# Patient Record
Sex: FEMALE | Race: WHITE | Employment: OTHER | URBAN - METROPOLITAN AREA
[De-identification: names, ages, dates, MRNs, and addresses within clinical notes are randomized per-mention and may not be internally consistent; named-entity substitution may affect disease eponyms.]

---

## 2017-02-01 ENCOUNTER — OFFICE VISIT (OUTPATIENT)
Dept: CARDIOLOGY CLINIC | Age: 62
End: 2017-02-01

## 2017-02-01 VITALS
HEART RATE: 79 BPM | DIASTOLIC BLOOD PRESSURE: 75 MMHG | OXYGEN SATURATION: 98 % | BODY MASS INDEX: 27.32 KG/M2 | SYSTOLIC BLOOD PRESSURE: 176 MMHG | WEIGHT: 170 LBS | HEIGHT: 66 IN

## 2017-02-01 DIAGNOSIS — I10 ESSENTIAL HYPERTENSION: ICD-10-CM

## 2017-02-01 DIAGNOSIS — R00.2 PALPITATIONS: Primary | ICD-10-CM

## 2017-02-01 RX ORDER — METOPROLOL SUCCINATE 25 MG/1
25 TABLET, EXTENDED RELEASE ORAL DAILY
COMMUNITY
Start: 2017-01-22

## 2017-02-01 NOTE — MR AVS SNAPSHOT
Visit Information Date & Time Provider Department Dept. Phone Encounter #  
 2/1/2017 10:20 AM Jose Miguel Everett MD Cardio Specialist at [facility]/[address] [phone] [id number] Follow-up Instructions Return in about 4 weeks (around 3/1/2017). Upcoming Health Maintenance Date Due Hepatitis C Screening 1955 DTaP/Tdap/Td series (1 - Tdap) 5/9/1977 PAP AKA CERVICAL CYTOLOGY 5/9/1977 BREAST CANCER SCRN MAMMOGRAM 5/9/2006 FOBT Q 1 YEAR AGE 50-75 5/9/2006 ZOSTER VACCINE AGE 60> 5/9/2016 INFLUENZA AGE 9 TO ADULT 8/1/2016 Allergies as of 2/1/2017  Review Complete On: 2/1/2017 By: Jose Miguel Everett MD  
 No Known Allergies Current Immunizations  Reviewed on 3/13/2016 Name Date Influenza Vaccine 10/1/2015 Not reviewed this visit You Were Diagnosed With   
  
 Codes Comments Palpitations    -  Primary ICD-10-CM: R00.2 ICD-9-CM: 785.1 Essential hypertension     ICD-10-CM: I10 
ICD-9-CM: 401.9 Vitals BP Pulse Height(growth percentile) Weight(growth percentile) SpO2 BMI  
 176/75 79 5' 6\" (1.676 m) 170 lb (77.1 kg) 98% 27.44 kg/m2 OB Status Smoking Status Postmenopausal Never Smoker BMI and BSA Data Body Mass Index Body Surface Area  
 27.44 kg/m 2 1.89 m 2 Preferred Pharmacy Pharmacy Name Phone [address] [phone] Your Updated Medication List  
  
   
This list is accurate as of: 2/1/17 11:16 AM.  Always use your most recent med list.  
  
  
  
  
 ALPRAZolam 0.5 mg tablet Commonly known as:  Kaleta Filomena Take 0.5 mg by mouth daily as needed. aspirin 325 mg tablet Commonly known as:  ASPIRIN Take 1 Tab by mouth daily. atorvastatin 20 mg tablet Commonly known as:  LIPITOR Take 20 mg by mouth daily. calcium 500 mg Tab Take 2 Tabs by mouth daily. cyclobenzaprine 10 mg tablet Commonly known as:  FLEXERIL Take 10 mg by mouth three (3) times daily as needed. gabapentin 100 mg capsule Commonly known as:  NEURONTIN One cap every morning, and 2 caps every night  
  
 glucosamine-chondroitin 500-400 mg Cap Commonly known as:  [address] Take 1 Cap by mouth daily. ibuprofen 400 mg tablet Commonly known as:  MOTRIN Take 1 Tab by mouth every six (6) hours as needed for Pain.  
  
 levothyroxine 25 mcg tablet Commonly known as:  SYNTHROID Take 25 mcg by mouth Daily (before breakfast). metoprolol succinate 25 mg XL tablet Commonly known as:  TOPROL-XL Take 25 mg by mouth daily. omega-3 fatty acids-vitamin e 1,000 mg Cap Take 3 Caps by mouth daily. We Performed the Following AMB POC EKG ROUTINE W/ 12 LEADS, INTER & REP [31145 CPT(R)] Follow-up Instructions Return in about 4 weeks (around 3/1/2017). Introducing [facility] SERVICES! Dear Benjamin Hodges: Thank you for requesting a [email or web address] account. Our records indicate that you already have an active [email or web address] account. You can access your account anytime at [email or web address] Did you know that you can access your hospital and ER discharge instructions at any time in [email or web address]? You can also review all of your test results from your hospital stay or ER visit. Additional Information If you have questions, please visit the Frequently Asked Questions section of the [email or web address] website at [email or web address] Remember, [email or web address] is NOT to be used for urgent needs. For medical emergencies, dial 911. Now available from your iPhone and Android! Please provide this summary of care documentation to your next provider. Your primary care clinician is listed as Belen Chau. If you have any questions after today's visit, please call [phone].

## 2017-02-01 NOTE — PROGRESS NOTES
1. Have you been to the ER, urgent care clinic since your last visit? Hospitalized since your last visit? No    2. Have you seen or consulted any other health care providers outside of the [address] [place] [address] since your last visit? Include any pap smears or colon screening.  No

## 2017-02-01 NOTE — PROGRESS NOTES
History of Present Illness:  A 62 y.o. female referred by Dr. Cecy Santiago for evaluation for palpitations. She has had these palpitations for about a year and tried on multiple medications including Metoprolol, switched to Propranolol. She had some issues with bradycardia and was tried on Cardizem and subsequently Toprol 25 mg a day. She has had some atypical chest pain as well as nerve pain. She relates the initiation of the palpitations to when she started having problems with her cervical spine. She was treated with Prednisone for a while and taking Neurontin. No syncope, PND, orthopnea, edema. Impression:   1. History of recurrent palpitations which appear to be hypersensitivity to sinus arrhythmia. 2. Possible white coat syndrome, however, blood pressure is consistently elevated whenever she is in the office. She has had her blood pressure checked with home device and is always normal.  3. History of stress test in 2016, unremarkable. 4. History of cervical disc disease with left arm paresthesias with history of treatment with Prednisone. 5. History of questionable TIA. At this point, I do not have a good explanation of her palpitations. I am feeling her pulse and she tells me she is having the palpitations but it is completely regular and was confirmed by the EKG. She does, however, have a history of bradycardia. I am going to check another 48-hour Holter monitor as well as an ambulatory blood pressure monitor to make sure that she is not hypertensive and potentially more sensitive to her sinus rhythm during those episodes. I will tentatively have her see Dr. Delisa Riley in follow up. I see no indication for an EP study or pacemaker at this time. A pacemaker would simply change the origin of her beats, but not change overall symptoms. She has had her thyroid checked which has been normal.  All questions answered.     Past Medical History   Diagnosis Date    Hypercholesterolemia     Hypertension     Hypothyroid        Current Outpatient Prescriptions   Medication Sig Dispense Refill    metoprolol succinate (TOPROL-XL) 25 mg XL tablet Take 25 mg by mouth daily.  ALPRAZolam (XANAX) 0.5 mg tablet Take 0.5 mg by mouth daily as needed.  cyclobenzaprine (FLEXERIL) 10 mg tablet Take 10 mg by mouth three (3) times daily as needed.  gabapentin (NEURONTIN) 100 mg capsule One cap every morning, and 2 caps every night      ibuprofen (MOTRIN) 400 mg tablet Take 1 Tab by mouth every six (6) hours as needed for Pain. 20 Tab 0    levothyroxine (SYNTHROID) 25 mcg tablet Take 25 mcg by mouth Daily (before breakfast).  atorvastatin (LIPITOR) 20 mg tablet Take 20 mg by mouth daily.  aspirin (ASPIRIN) 325 mg tablet Take 1 Tab by mouth daily. 30 Tab 0    omega-3 fatty acids-vitamin e 1,000 mg cap Take 3 Caps by mouth daily.  glucosamine-chondroitin (ARTHX) 500-400 mg cap Take 1 Cap by mouth daily.  calcium 500 mg tab Take 2 Tabs by mouth daily. Social History   reports that she has never smoked. She does not have any smokeless tobacco history on file. reports that she drinks alcohol. Family History  family history is not on file. Review of Systems  Except as stated above include:  Constitutional: Negative for fever, chills and malaise/fatigue. HEENT: No congestion or recent URI. Gastrointestinal: No nausea, vomiting, abdominal pain, bloody stools. Pulmonary:  Negative except as stated above. Cardiac:  Negative except as stated above. Musculoskeletal: Negative except as stated above. Neurological:  No localized symptoms. Skin:  Negative except as stated above. Psych:  No mood swings. Endocrine:  No heat/cold intolerance.     PHYSICAL EXAM  BP Readings from Last 3 Encounters:   02/01/17 176/75   12/14/16 180/75   10/28/16 135/56     Pulse Readings from Last 3 Encounters:   02/01/17 79   12/14/16 87   10/28/16 (!) 56     Wt Readings from Last 3 Encounters:   02/01/17 77.1 kg (170 lb)   12/14/16 76.7 kg (169 lb)   10/27/16 76.2 kg (168 lb)     General:   Well developed, well groomed. Head/Neck:   No jugular venous distention     No carotid bruits. No evidence of xanthelasma. Lungs:   No respiratory distress. Clear bilaterally. Heart:    Regular rate and rhythm. Normal S1/S2. Palpation of heart with normal point of maximum impulse. RUSB systolic murmur  Abdomen:   Soft and nontender. No palpable abdominal mass or bruits. Extremities:   Intact peripheral pulses. No significant edema. Neurological:   Alert and oriented to person, place, time. No focal neurological deficit visually.

## 2017-02-28 ENCOUNTER — HOSPITAL ENCOUNTER (OUTPATIENT)
Dept: NON INVASIVE DIAGNOSTICS | Age: 62
Discharge: HOME | End: 2017-02-28
Attending: INTERNAL MEDICINE
Payer: COMMERCIAL

## 2017-02-28 DIAGNOSIS — R00.2 PALPITATIONS: ICD-10-CM

## 2017-02-28 PROCEDURE — 93225 XTRNL ECG REC<48 HRS REC: CPT

## 2017-04-03 ENCOUNTER — OFFICE VISIT (OUTPATIENT)
Dept: CARDIOLOGY CLINIC | Age: 62
End: 2017-04-03

## 2017-04-03 VITALS
DIASTOLIC BLOOD PRESSURE: 76 MMHG | HEART RATE: 80 BPM | WEIGHT: 174 LBS | OXYGEN SATURATION: 98 % | HEIGHT: 66 IN | SYSTOLIC BLOOD PRESSURE: 177 MMHG | BODY MASS INDEX: 27.97 KG/M2

## 2017-04-03 DIAGNOSIS — R42 DIZZINESS: ICD-10-CM

## 2017-04-03 DIAGNOSIS — R20.0 NUMBNESS: ICD-10-CM

## 2017-04-03 DIAGNOSIS — E78.5 DYSLIPIDEMIA: ICD-10-CM

## 2017-04-03 DIAGNOSIS — R00.2 PALPITATIONS: ICD-10-CM

## 2017-04-03 DIAGNOSIS — G45.9 TRANSIENT CEREBRAL ISCHEMIA, UNSPECIFIED TYPE: ICD-10-CM

## 2017-04-03 DIAGNOSIS — R07.2 PRECORDIAL PAIN: ICD-10-CM

## 2017-04-03 DIAGNOSIS — I10 ESSENTIAL HYPERTENSION: Primary | Chronic | ICD-10-CM

## 2017-04-03 NOTE — LETTER
Patient:  Margarita Maldonado YOB: 1955 Date of Visit: 4/3/2017 Dear LUCIA Bal 
[address] [facility],[address] 
[address] [place] [id number] VIA Facsimile: [phone] 
 : Thank you for referring Ms. Margarita Maldonado to me for evaluation/treatment. Below are the relevant portions of my assessment and plan of care. Subjective:  
   Margarita Maldonado is in the office today for cardiac reevaluation. She is a 45-year-old woman that has been troubled by palpitations for years. She has been on various betablockers and at the present time, is taking metoprolol succinate at 25 mg per day. She had some excessive slowing of heart rate on betablockers in the past.  She was also on Cardizem at one point, which she did not tolerate. She also complains of chest pain and had a CT angiogram of the heart done on 11/07/2016. Her coronaries were normal at that time. Because of the persistence of her symptoms, she had an electrophysiological office evaluation done by Dr. Tennille Valderrama on 02/01/2017. Dr. Tennille Valderrama mentioned that when the patient was complaining of palpitations, he was taking her pulse and there was no irregularity, which was confirmed by electrocardiogram.  No further suggestions in her care were made at that time. In the office today, she says she is doing El Kobi.   She is still having nerve pain and this is being evaluated by Neurology. She does have some problems with reflux and has been taking an acid reliever.   The patient relates no new symptoms since her last visit. Patient Active Problem List  
 Diagnosis Date Noted  Numbness 04/25/2016 Priority: 1 - One  
 Normal coronary arteries 12/21/2016  Palpitations 05/17/2016  Dyslipidemia 04/13/2016  Dizziness 04/13/2016  Precordial pain 04/13/2016  TIA (transient ischemic attack) 03/13/2016  Hypertension 03/13/2016  Hypothyroidism 03/13/2016  Hyperglycemia 03/13/2016  UTI (urinary tract infection) 03/13/2016 Current Outpatient Prescriptions Medication Sig Dispense Refill  metoprolol succinate (TOPROL-XL) 25 mg XL tablet Take 25 mg by mouth daily.  ALPRAZolam (XANAX) 0.5 mg tablet Take 0.5 mg by mouth daily as needed.  cyclobenzaprine (FLEXERIL) 10 mg tablet Take 10 mg by mouth three (3) times daily as needed.  gabapentin (NEURONTIN) 100 mg capsule One cap every morning, and 2 caps every night  ibuprofen (MOTRIN) 400 mg tablet Take 1 Tab by mouth every six (6) hours as needed for Pain. 20 Tab 0  
 levothyroxine (SYNTHROID) 25 mcg tablet Take 25 mcg by mouth Daily (before breakfast).  atorvastatin (LIPITOR) 20 mg tablet Take 20 mg by mouth daily.  aspirin (ASPIRIN) 325 mg tablet Take 1 Tab by mouth daily. 30 Tab 0  
 omega-3 fatty acids-vitamin e 1,000 mg cap Take 3 Caps by mouth daily.  glucosamine-chondroitin (ARTHX) 500-400 mg cap Take 1 Cap by mouth daily.  calcium 500 mg tab Take 2 Tabs by mouth daily. No Known Allergies Past Medical History:  
Diagnosis Date  Hypercholesterolemia  Hypertension  Hypothyroid No past surgical history on file. No family history on file. History Smoking Status  Never Smoker Smokeless Tobacco  
 Not on file Review of Systems, additional: 
Constitutional: negative Eyes: negative Respiratory: negative Cardiovascular: positive for palpitations Gastrointestinal: negative Musculoskeletal:negative Neurological: negative Behvioral/Psych: negative Endocrine: negative ENT: negative Objective:  
 
Visit Vitals  /76  Pulse 80  
 Ht 5' 6\" (1.676 m)  Wt 174 lb (78.9 kg)  SpO2 98%  BMI 28.08 kg/m2 General:  alert, cooperative, no distress Chest Wall: inspection normal - no chest wall deformities or tenderness, respiratory effort normal  
Lung: clear to auscultation bilaterally Heart:  normal rate, regular rhythm, normal S1, S2, no murmurs, rubs, clicks or gallops Abdomen: soft, non-tender. Bowel sounds normal. No masses,  no organomegaly Extremities: extremities normal, atraumatic, no cyanosis or edema Skin: no rashes Neuro: alert, oriented, normal speech, no focal findings or movement disorder noted Assessment/Plan: ICD-10-CM ICD-9-CM 1. Essential hypertension I10 401.9 2. Transient cerebral ischemia, unspecified type G45.9 435.9 3. Dyslipidemia E78.5 272.4 4. Dizziness R42 780.4 5. Precordial pain, atypical for cardiac origin R07.2 786.51   
6. Palpitations, recently evaluated by EP , no new suggestions. Will continue metoprolol. Recent 48 hour holter on 3/3/2017; Minimal ectopy with no repetitive ventricular ectopics. RT 4 mos R00.2 785.1 7. Normal coronary arteries, Cardiac CTA done on 11/7/2016 Z03.89 V71.7 8. Numbness R20.0 782.0 If you have questions, please do not hesitate to call me. I look forward to following Ms. Julieta Mullendebbi along with you. Sincerely, Marvin Maldonado MD

## 2017-04-03 NOTE — PROGRESS NOTES
1. Have you been to the ER, urgent care clinic since your last visit? Hospitalized since your last visit? No    2. Have you seen or consulted any other health care providers outside of the [address] [place] [address] since your last visit? Include any pap smears or colon screening.  No

## 2017-04-03 NOTE — MR AVS SNAPSHOT
Visit Information Date & Time Provider Department Dept. Phone Encounter #  
 4/3/2017 11:00 AM [facility] Rajput  [facility] [place] [phone] Follow-up Instructions Return in about 4 months (around 8/3/2017). Upcoming Health Maintenance Date Due Hepatitis C Screening 1955 DTaP/Tdap/Td series (1 - Tdap) 5/9/1977 PAP AKA CERVICAL CYTOLOGY 5/9/1977 BREAST CANCER SCRN MAMMOGRAM 5/9/2006 FOBT Q 1 YEAR AGE 50-75 5/9/2006 ZOSTER VACCINE AGE 60> 5/9/2016 INFLUENZA AGE 9 TO ADULT 8/1/2016 Allergies as of 4/3/2017  Review Complete On: 4/3/2017 By: Anastasia Corrigan LPN No Known Allergies Current Immunizations  Reviewed on 3/13/2016 Name Date Influenza Vaccine 10/1/2015 Not reviewed this visit Vitals BP Pulse Height(growth percentile) Weight(growth percentile) SpO2 BMI  
 177/76 80 5' 6\" (1.676 m) 174 lb (78.9 kg) 98% 28.08 kg/m2 OB Status Smoking Status Postmenopausal Never Smoker Vitals History BMI and BSA Data Body Mass Index Body Surface Area 28.08 kg/m 2 1.92 m 2 Preferred Pharmacy Pharmacy Name Phone [facility] [address] [place] [address]. [address] [phone] Your Updated Medication List  
  
   
This list is accurate as of: 4/3/17 11:29 AM.  Always use your most recent med list.  
  
  
  
  
 ALPRAZolam 0.5 mg tablet Commonly known as:  Lesa [place] Take 0.5 mg by mouth daily as needed. aspirin 325 mg tablet Commonly known as:  ASPIRIN Take 1 Tab by mouth daily. atorvastatin 20 mg tablet Commonly known as:  LIPITOR Take 20 mg by mouth daily. calcium 500 mg Tab Take 2 Tabs by mouth daily. cyclobenzaprine 10 mg tablet Commonly known as:  FLEXERIL Take 10 mg by mouth three (3) times daily as needed. gabapentin 100 mg capsule Commonly known as:  NEURONTIN One cap every morning, and 2 caps every night  
  
 glucosamine-chondroitin 500-400 mg Cap Commonly known as:  [address] [facility] Take 1 Cap by mouth daily. ibuprofen 400 mg tablet Commonly known as:  MOTRIN Take 1 Tab by mouth every six (6) hours as needed for Pain.  
  
 levothyroxine 25 mcg tablet Commonly known as:  SYNTHROID Take 25 mcg by mouth Daily (before breakfast). metoprolol succinate 25 mg XL tablet Commonly known as:  TOPROL-XL Take 25 mg by mouth daily. omega-3 fatty acids-vitamin e 1,000 mg Cap Take 3 Caps by mouth daily. Follow-up Instructions Return in about 4 months (around 8/3/2017). Introducing [facility] SERVICES! Dear Lady Arias: Thank you for requesting a [employer] account. Our records indicate that you already have an active [employer] account. You can access your account anytime at [email or web address] Did you know that you can access your hospital and ER discharge instructions at any time in [employer]? You can also review all of your test results from your hospital stay or ER visit. Additional Information If you have questions, please visit the Frequently Asked Questions section of the [employer] website at [email or web address] Remember, [employer] is NOT to be used for urgent needs. For medical emergencies, dial 911. Now available from your iPhone and Android! Please provide this summary of care documentation to your next provider. Your primary care clinician is listed as Belen Chau. If you have any questions after today's visit, please call [phone].

## 2017-04-17 NOTE — PROGRESS NOTES
Subjective:      Kartik Goetz is in the office today for cardiac reevaluation. She is a 60-year-old woman that has been troubled by palpitations for years. She has been on various betablockers and at the present time, is taking metoprolol succinate at 25 mg per day. She had some excessive slowing of heart rate on betablockers in the past.  She was also on Cardizem at one point, which she did not tolerate. She also complains of chest pain and had a CT angiogram of the heart done on 11/07/2016. Her coronaries were normal at that time. Because of the persistence of her symptoms, she had an electrophysiological office evaluation done by Dr. Carmine Plaza on 02/01/2017. Dr. Carmine Plaza mentioned that when the patient was complaining of palpitations, he was taking her pulse and there was no irregularity, which was confirmed by electrocardiogram.  No further suggestions in her care were made at that time. In the office today, she says she is doing [place].   She is still having nerve pain and this is being evaluated by Neurology. She does have some problems with reflux and has been taking an acid reliever.   The patient relates no new symptoms since her last visit. Patient Active Problem List    Diagnosis Date Noted    Numbness 04/25/2016     Priority: 1 - One    Normal coronary arteries 12/21/2016    Palpitations 05/17/2016    Dyslipidemia 04/13/2016    Dizziness 04/13/2016    Precordial pain 04/13/2016    TIA (transient ischemic attack) 03/13/2016    Hypertension 03/13/2016    Hypothyroidism 03/13/2016    Hyperglycemia 03/13/2016    UTI (urinary tract infection) 03/13/2016     Current Outpatient Prescriptions   Medication Sig Dispense Refill    metoprolol succinate (TOPROL-XL) 25 mg XL tablet Take 25 mg by mouth daily.  ALPRAZolam (XANAX) 0.5 mg tablet Take 0.5 mg by mouth daily as needed.       cyclobenzaprine (FLEXERIL) 10 mg tablet Take 10 mg by mouth three (3) times daily as needed.  gabapentin (NEURONTIN) 100 mg capsule One cap every morning, and 2 caps every night      ibuprofen (MOTRIN) 400 mg tablet Take 1 Tab by mouth every six (6) hours as needed for Pain. 20 Tab 0    levothyroxine (SYNTHROID) 25 mcg tablet Take 25 mcg by mouth Daily (before breakfast).  atorvastatin (LIPITOR) 20 mg tablet Take 20 mg by mouth daily.  aspirin (ASPIRIN) 325 mg tablet Take 1 Tab by mouth daily. 30 Tab 0    omega-3 fatty acids-vitamin e 1,000 mg cap Take 3 Caps by mouth daily.  glucosamine-chondroitin (ARTHX) 500-400 mg cap Take 1 Cap by mouth daily.  calcium 500 mg tab Take 2 Tabs by mouth daily. No Known Allergies  Past Medical History:   Diagnosis Date    Hypercholesterolemia     Hypertension     Hypothyroid      No past surgical history on file. No family history on file. History   Smoking Status    Never Smoker   Smokeless Tobacco    Not on file          Review of Systems, additional:  Constitutional: negative  Eyes: negative  Respiratory: negative  Cardiovascular: positive for palpitations  Gastrointestinal: negative  Musculoskeletal:negative  Neurological: negative  Behvioral/Psych: negative  Endocrine: negative  ENT: negative    Objective:     Visit Vitals    /76    Pulse 80    Ht 5' 6\" (1.676 m)    Wt 174 lb (78.9 kg)    SpO2 98%    BMI 28.08 kg/m2     General:  alert, cooperative, no distress   Chest Wall: inspection normal - no chest wall deformities or tenderness, respiratory effort normal   Lung: clear to auscultation bilaterally   Heart:  normal rate, regular rhythm, normal S1, S2, no murmurs, rubs, clicks or gallops   Abdomen: soft, non-tender. Bowel sounds normal. No masses,  no organomegaly   Extremities: extremities normal, atraumatic, no cyanosis or edema Skin: no rashes   Neuro: alert, oriented, normal speech, no focal findings or movement disorder noted         Assessment/Plan:       ICD-10-CM ICD-9-CM    1. Essential hypertension I10 401.9    2. Transient cerebral ischemia, unspecified type G45.9 435.9    3. Dyslipidemia E78.5 272.4    4. Dizziness R42 780.4    5. Precordial pain, atypical for cardiac origin R07.2 786.51    6. Palpitations, recently evaluated by EP , no new suggestions. Will continue metoprolol. Recent 48 hour holter on 3/3/2017; Minimal ectopy with no repetitive ventricular ectopics. RT 4 mos R00.2 785.1    7. Normal coronary arteries, Cardiac CTA done on 11/7/2016 Z03.89 V71.7    8.  Numbness R20.0 782.0

## 2017-04-21 NOTE — COMMUNICATION BODY
Subjective:      Ny Mast is in the office today for cardiac reevaluation. She is a 45-year-old woman that has been troubled by palpitations for years. She has been on various betablockers and at the present time, is taking metoprolol succinate at 25 mg per day. She had some excessive slowing of heart rate on betablockers in the past.  She was also on Cardizem at one point, which she did not tolerate. She also complains of chest pain and had a CT angiogram of the heart done on 11/07/2016. Her coronaries were normal at that time. Because of the persistence of her symptoms, she had an electrophysiological office evaluation done by Dr. Tamar Swift on 02/01/2017. Dr. Tamar Swift mentioned that when the patient was complaining of palpitations, he was taking her pulse and there was no irregularity, which was confirmed by electrocardiogram.  No further suggestions in her care were made at that time. In the office today, she says she is doing El Kobi.   She is still having nerve pain and this is being evaluated by Neurology. She does have some problems with reflux and has been taking an acid reliever.   The patient relates no new symptoms since her last visit. Patient Active Problem List    Diagnosis Date Noted    Numbness 04/25/2016     Priority: 1 - One    Normal coronary arteries 12/21/2016    Palpitations 05/17/2016    Dyslipidemia 04/13/2016    Dizziness 04/13/2016    Precordial pain 04/13/2016    TIA (transient ischemic attack) 03/13/2016    Hypertension 03/13/2016    Hypothyroidism 03/13/2016    Hyperglycemia 03/13/2016    UTI (urinary tract infection) 03/13/2016     Current Outpatient Prescriptions   Medication Sig Dispense Refill    metoprolol succinate (TOPROL-XL) 25 mg XL tablet Take 25 mg by mouth daily.  ALPRAZolam (XANAX) 0.5 mg tablet Take 0.5 mg by mouth daily as needed.       cyclobenzaprine (FLEXERIL) 10 mg tablet Take 10 mg by mouth three (3) times daily as needed.  gabapentin (NEURONTIN) 100 mg capsule One cap every morning, and 2 caps every night      ibuprofen (MOTRIN) 400 mg tablet Take 1 Tab by mouth every six (6) hours as needed for Pain. 20 Tab 0    levothyroxine (SYNTHROID) 25 mcg tablet Take 25 mcg by mouth Daily (before breakfast).  atorvastatin (LIPITOR) 20 mg tablet Take 20 mg by mouth daily.  aspirin (ASPIRIN) 325 mg tablet Take 1 Tab by mouth daily. 30 Tab 0    omega-3 fatty acids-vitamin e 1,000 mg cap Take 3 Caps by mouth daily.  glucosamine-chondroitin (ARTHX) 500-400 mg cap Take 1 Cap by mouth daily.  calcium 500 mg tab Take 2 Tabs by mouth daily. No Known Allergies  Past Medical History:   Diagnosis Date    Hypercholesterolemia     Hypertension     Hypothyroid      No past surgical history on file. No family history on file. History   Smoking Status    Never Smoker   Smokeless Tobacco    Not on file          Review of Systems, additional:  Constitutional: negative  Eyes: negative  Respiratory: negative  Cardiovascular: positive for palpitations  Gastrointestinal: negative  Musculoskeletal:negative  Neurological: negative  Behvioral/Psych: negative  Endocrine: negative  ENT: negative    Objective:     Visit Vitals    /76    Pulse 80    Ht 5' 6\" (1.676 m)    Wt 174 lb (78.9 kg)    SpO2 98%    BMI 28.08 kg/m2     General:  alert, cooperative, no distress   Chest Wall: inspection normal - no chest wall deformities or tenderness, respiratory effort normal   Lung: clear to auscultation bilaterally   Heart:  normal rate, regular rhythm, normal S1, S2, no murmurs, rubs, clicks or gallops   Abdomen: soft, non-tender. Bowel sounds normal. No masses,  no organomegaly   Extremities: extremities normal, atraumatic, no cyanosis or edema Skin: no rashes   Neuro: alert, oriented, normal speech, no focal findings or movement disorder noted         Assessment/Plan:       ICD-10-CM ICD-9-CM    1. Essential hypertension I10 401.9    2. Transient cerebral ischemia, unspecified type G45.9 435.9    3. Dyslipidemia E78.5 272.4    4. Dizziness R42 780.4    5. Precordial pain, atypical for cardiac origin R07.2 786.51    6. Palpitations, recently evaluated by EP , no new suggestions. Will continue metoprolol. Recent 48 hour holter on 3/3/2017; Minimal ectopy with no repetitive ventricular ectopics. RT 4 mos R00.2 785.1    7. Normal coronary arteries, Cardiac CTA done on 11/7/2016 Z03.89 V71.7    8.  Numbness R20.0 782.0

## 2017-09-11 ENCOUNTER — OFFICE VISIT (OUTPATIENT)
Dept: CARDIOLOGY CLINIC | Age: 62
End: 2017-09-11

## 2017-09-11 VITALS
WEIGHT: 180 LBS | DIASTOLIC BLOOD PRESSURE: 80 MMHG | SYSTOLIC BLOOD PRESSURE: 174 MMHG | OXYGEN SATURATION: 96 % | BODY MASS INDEX: 28.93 KG/M2 | HEIGHT: 66 IN

## 2017-09-11 DIAGNOSIS — G45.9 TRANSIENT CEREBRAL ISCHEMIA, UNSPECIFIED TYPE: ICD-10-CM

## 2017-09-11 DIAGNOSIS — E78.5 DYSLIPIDEMIA: ICD-10-CM

## 2017-09-11 DIAGNOSIS — R00.2 PALPITATIONS: ICD-10-CM

## 2017-09-11 DIAGNOSIS — R07.2 PRECORDIAL PAIN: ICD-10-CM

## 2017-09-11 DIAGNOSIS — R42 DIZZINESS: ICD-10-CM

## 2017-09-11 DIAGNOSIS — R20.0 NUMBNESS: ICD-10-CM

## 2017-09-11 DIAGNOSIS — I10 ESSENTIAL HYPERTENSION: Primary | Chronic | ICD-10-CM

## 2017-09-11 NOTE — MR AVS SNAPSHOT
Visit Information Date & Time Provider Department Dept. Phone Encounter #  
 9/11/2017 10:45 AM Mckenzie Sandoval  [facility] [place] [address] [id number] Follow-up Instructions Return in about 6 months (around 3/11/2018). Upcoming Health Maintenance Date Due Hepatitis C Screening 1955 DTaP/Tdap/Td series (1 - Tdap) 5/9/1977 PAP AKA CERVICAL CYTOLOGY 5/9/1977 BREAST CANCER SCRN MAMMOGRAM 5/9/2006 FOBT Q 1 YEAR AGE 50-75 5/9/2006 ZOSTER VACCINE AGE 60> 3/9/2016 INFLUENZA AGE 9 TO ADULT 8/1/2017 Allergies as of 9/11/2017  Review Complete On: 4/17/2017 By: Mckenzie Sandoval MD  
 No Known Allergies Current Immunizations  Reviewed on 3/13/2016 Name Date Influenza Vaccine 10/1/2015 Not reviewed this visit Vitals BP Height(growth percentile) Weight(growth percentile) SpO2 BMI OB Status 174/80 5' 6\" (1.676 m) 180 lb (81.6 kg) 96% 29.05 kg/m2 Postmenopausal  
 Smoking Status Never Smoker BMI and BSA Data Body Mass Index Body Surface Area 29.05 kg/m 2 1.95 m 2 Preferred Pharmacy Pharmacy Name Phone [facility] [address] [place] [address]. [address] [phone] Your Updated Medication List  
  
   
This list is accurate as of: 9/11/17 11:09 AM.  Always use your most recent med list.  
  
  
  
  
 ALPRAZolam 0.5 mg tablet Commonly known as:  Darus Breeding Take 0.5 mg by mouth daily as needed. aspirin 325 mg tablet Commonly known as:  ASPIRIN Take 1 Tab by mouth daily. atorvastatin 20 mg tablet Commonly known as:  LIPITOR Take 20 mg by mouth daily. calcium 500 mg Tab Take 2 Tabs by mouth daily. cyclobenzaprine 10 mg tablet Commonly known as:  FLEXERIL Take 10 mg by mouth three (3) times daily as needed. gabapentin 100 mg capsule Commonly known as:  NEURONTIN  
 200 mg three (3) times daily. One cap every morning, and 2 caps every night  
  
 glucosamine-chondroitin 500-400 mg Cap Commonly known as:  [address] Take 1 Cap by mouth daily. ibuprofen 400 mg tablet Commonly known as:  MOTRIN Take 1 Tab by mouth every six (6) hours as needed for Pain.  
  
 levothyroxine 25 mcg tablet Commonly known as:  SYNTHROID Take 25 mcg by mouth Daily (before breakfast). metoprolol succinate 25 mg XL tablet Commonly known as:  TOPROL-XL Take 25 mg by mouth daily. omega-3 fatty acids-vitamin e 1,000 mg Cap Take 3 Caps by mouth daily. Follow-up Instructions Return in about 6 months (around 3/11/2018). Introducing [facility] SERVICES! Dear Markie Astorga: Thank you for requesting a [employer] account. Our records indicate that you already have an active [employer] account. You can access your account anytime at [email or web address] Did you know that you can access your hospital and ER discharge instructions at any time in [employer]? You can also review all of your test results from your hospital stay or ER visit. Additional Information If you have questions, please visit the Frequently Asked Questions section of the [employer] website at [email or web address] Remember, [employer] is NOT to be used for urgent needs. For medical emergencies, dial 911. Now available from your iPhone and Android! Please provide this summary of care documentation to your next provider. Your primary care clinician is listed as Catarina Garcia. If you have any questions after today's visit, please call [phone].

## 2017-09-15 NOTE — PROGRESS NOTES
Subjective:      Sherryle Pence is in the office today for cardiac reevaluation. She is a 20-year-old woman that has been troubled by palpitations for years. She has been on various betablockers and at the present time, is taking metoprolol succinate at 25 mg per day. She had some excessive slowing of heart rate on betablockers in the past.  She was also on Cardizem at one point, which she did not tolerate. In the office today, she says there has been little change. She also complains of chest pain and had a CT angiogram of the heart was done on 11/07/2016. Her coronaries were normal at that time. Because of the persistence of her symptoms, she had an electrophysiological office evaluation done by Dr. Kosta Barr on 02/01/2017. Dr. Kosta Barr mentioned that when the patient was complaining of palpitations, he was taking her pulse and there was no irregularity, which was confirmed by electrocardiography. No further suggestions in her care were made at that time. In the office today, she says she is doing El Kobi.   She is still having nerve pain and this is being evaluated by Neurology. She has an upcoming appointment with Dr Evelyn Freed. Patient Active Problem List    Diagnosis Date Noted    Numbness 04/25/2016     Priority: 1 - One    Normal coronary arteries 12/21/2016    Palpitations 05/17/2016    Dyslipidemia 04/13/2016    Dizziness 04/13/2016    Precordial pain 04/13/2016    TIA (transient ischemic attack) 03/13/2016    Hypertension 03/13/2016    Hypothyroidism 03/13/2016    Hyperglycemia 03/13/2016    UTI (urinary tract infection) 03/13/2016     Current Outpatient Prescriptions   Medication Sig Dispense Refill    metoprolol succinate (TOPROL-XL) 25 mg XL tablet Take 25 mg by mouth daily.  ALPRAZolam (XANAX) 0.5 mg tablet Take 0.5 mg by mouth daily as needed.  cyclobenzaprine (FLEXERIL) 10 mg tablet Take 10 mg by mouth three (3) times daily as needed.  gabapentin (NEURONTIN) 100 mg capsule 200 mg three (3) times daily. One cap every morning, and 2 caps every night      ibuprofen (MOTRIN) 400 mg tablet Take 1 Tab by mouth every six (6) hours as needed for Pain. 20 Tab 0    levothyroxine (SYNTHROID) 25 mcg tablet Take 25 mcg by mouth Daily (before breakfast).  atorvastatin (LIPITOR) 20 mg tablet Take 20 mg by mouth daily.  aspirin (ASPIRIN) 325 mg tablet Take 1 Tab by mouth daily. 30 Tab 0    omega-3 fatty acids-vitamin e 1,000 mg cap Take 3 Caps by mouth daily.  glucosamine-chondroitin (ARTHX) 500-400 mg cap Take 1 Cap by mouth daily.  calcium 500 mg tab Take 2 Tabs by mouth daily. No Known Allergies  Past Medical History:   Diagnosis Date    Hypercholesterolemia     Hypertension     Hypothyroid      No past surgical history on file. No family history on file. History   Smoking Status    Never Smoker   Smokeless Tobacco    Not on file          Review of Systems, additional:  Constitutional: negative  Eyes: negative  Respiratory: negative  Cardiovascular: positive for palpitations  Gastrointestinal: negative  Musculoskeletal:negative  Neurological: negative  Behvioral/Psych: negative  Endocrine: negative  ENT: negative    Objective:     Visit Vitals    /80    Ht 5' 6\" (1.676 m)    Wt 180 lb (81.6 kg)    SpO2 96%    BMI 29.05 kg/m2     General:  alert, cooperative, no distress   Chest Wall: inspection normal - no chest wall deformities or tenderness, respiratory effort normal   Lung: clear to auscultation bilaterally   Heart:  normal rate, regular rhythm, normal S1, S2, no murmurs, rubs, clicks or gallops   Abdomen: soft, non-tender. Bowel sounds normal. No masses,  no organomegaly   Extremities: extremities normal, atraumatic, no cyanosis or edema Skin: no rashes   Neuro: alert, oriented, normal speech, no focal findings or movement disorder noted         Assessment/Plan:       ICD-10-CM ICD-9-CM    1.  Essential hypertension, BP well-controlled at home. I10 401.9    2. Transient cerebral ischemia, unspecified type G45.9 435.9    3. Dyslipidemia E78.5 272.4    4. Dizziness R42 780.4    5. Precordial pain, atypical for cardiac origin with normal coronaries by Cardiac CTA in 11/2016 R07.2 786.51    6. Palpitations,  evaluated by EP. Will continue metoprolol. Last 48 hour holter on 3/3/2017; Minimal ectopy with no repetitive ventricular ectopics. RT 6 mos R00.2 785.1    7. Normal coronary arteries, by cardiac CTA done on 11/7/2016 Z03.89 V71.7    8.  Numbness, has appointment to see Neurology R20.0 782.0

## 2018-02-08 ENCOUNTER — HOSPITAL ENCOUNTER (OUTPATIENT)
Dept: ULTRASOUND IMAGING | Age: 63
Discharge: HOME | End: 2018-02-08
Attending: FAMILY MEDICINE
Payer: COMMERCIAL

## 2018-02-08 DIAGNOSIS — R10.11 ABDOMINAL PAIN, RIGHT UPPER QUADRANT: ICD-10-CM

## 2018-02-08 PROCEDURE — 76705 ECHO EXAM OF ABDOMEN: CPT

## 2018-02-13 ENCOUNTER — OFFICE VISIT (OUTPATIENT)
Dept: SURGERY | Age: 63
End: 2018-02-13

## 2018-02-13 VITALS
RESPIRATION RATE: 16 BRPM | HEIGHT: 66 IN | OXYGEN SATURATION: 97 % | TEMPERATURE: 97.2 F | WEIGHT: 176 LBS | HEART RATE: 85 BPM | SYSTOLIC BLOOD PRESSURE: 169 MMHG | BODY MASS INDEX: 28.28 KG/M2 | DIASTOLIC BLOOD PRESSURE: 67 MMHG

## 2018-02-13 DIAGNOSIS — R10.11 ABDOMINAL PAIN, RIGHT UPPER QUADRANT: ICD-10-CM

## 2018-02-13 DIAGNOSIS — K80.20 GALLSTONES: Primary | ICD-10-CM

## 2018-02-13 RX ORDER — AMITRIPTYLINE HYDROCHLORIDE 10 MG/1
10 TABLET, FILM COATED ORAL
COMMUNITY
Start: 2018-02-11 | End: 2019-08-19

## 2018-02-13 NOTE — MR AVS SNAPSHOT
[facility] 
 
 
 [address] [address] 
[phone] Patient: Tammy Luciano MRN: [id number] Visit Information Date & Time Provider Department Dept. Phone Encounter #  
 2/13/2018  3:00 PM Rahel Manrique MD [address] [phone] [id number] Your Appointments 3/7/2018 11:15 AM  
POST OP with Rahel Manrique MD  
[address] [facility]) Appt Note: Post Op [address]  
  
   
 [address] [facility] [address] [facility] 95 Upcoming Health Maintenance Date Due Hepatitis C Screening 1955 DTaP/Tdap/Td series (1 - Tdap) 5/9/1977 PAP AKA CERVICAL CYTOLOGY 5/9/1977 BREAST CANCER SCRN MAMMOGRAM 5/9/2006 FOBT Q 1 YEAR AGE 50-75 5/9/2006 ZOSTER VACCINE AGE 60> 3/9/2016 Influenza Age 5 to Adult 8/1/2017 Allergies as of 2/13/2018  Review Complete On: 2/13/2018 By: Neida Bishop No Known Allergies Current Immunizations  Reviewed on 3/13/2016 Name Date Influenza Vaccine 10/1/2015 Not reviewed this visit You Were Diagnosed With   
  
 Codes Comments Gallstones    -  Primary ICD-10-CM: O04.51 ICD-9-CM: 574.20 Abdominal pain, right upper quadrant     ICD-10-CM: R10.11 ICD-9-CM: 789.01 Vitals BP Pulse Temp Resp Height(growth percentile) Weight(growth percentile) 169/67 (BP 1 Location: Right arm, BP Patient Position: Sitting) 85 97.2 °F (36.2 °C) (Oral) 16 5' 6\" (1.676 m) 176 lb (79.8 kg) SpO2 BMI OB Status Smoking Status 97% 28.41 kg/m2 Postmenopausal Never Smoker BMI and BSA Data Body Mass Index Body Surface Area  
 28.41 kg/m 2 1.93 m 2 Preferred Pharmacy Pharmacy Name Phone  [phone] [facility] [place], [address] AT [facility] [place] At [address] [place] [address] [phone] Your Updated Medication List  
  
   
This list is accurate as of: 2/13/18  3:32 PM.  Always use your most recent med list.  
  
  
  
  
 ALPRAZolam 0.5 mg tablet Commonly known as:  Ermelinda Alert Take 0.5 mg by mouth daily as needed. amitriptyline 10 mg tablet Commonly known as:  ELAVIL  
  
 aspirin 325 mg tablet Commonly known as:  ASPIRIN Take 1 Tab by mouth daily. atorvastatin 20 mg tablet Commonly known as:  LIPITOR Take 20 mg by mouth daily. calcium 500 mg Tab Take 2 Tabs by mouth daily. cyclobenzaprine 10 mg tablet Commonly known as:  FLEXERIL Take 10 mg by mouth three (3) times daily as needed. gabapentin 100 mg capsule Commonly known as:  NEURONTIN  
200 mg three (3) times daily. One cap every morning, and 2 caps every night  
  
 glucosamine-chondroitin 500-400 mg Cap Commonly known as:  [address] Take 1 Cap by mouth daily. ibuprofen 400 mg tablet Commonly known as:  MOTRIN Take 1 Tab by mouth every six (6) hours as needed for Pain.  
  
 levothyroxine 25 mcg tablet Commonly known as:  SYNTHROID Take 25 mcg by mouth Daily (before breakfast). metoprolol succinate 25 mg XL tablet Commonly known as:  TOPROL-XL Take 25 mg by mouth daily. omega-3 fatty acids-vitamin e 1,000 mg Cap Take 3 Caps by mouth daily. Patient Instructions If you have any questions or concerns about today's appointment, the verbal and/or written instructions you were given for follow up care, please call our office at [phone]. [facility] [place] [id number] [place] [address], [address] [place] [address] 
 
[phone] office [phone] fax [place] PATIENT PRE AND POST OPERATIVE INSTRUCTIONS [facility] [address] [address] [address] 
[phone] Before Surgery Instructions: 1) You must have someone available to drive you to and from your procedure and stay with you for the first 24 hours. 2) It is very important that you have nothing to eat or drink after midnight the night before your surgery. This includes chewing gum or sucking on hard candy. Take only heart, blood pressure and cholesterol medications the morning of surgery with only a sip of water. 3) Please stop taking Plavix 10 days prior to your surgery. Stop taking Coumadin 5 days prior to your surgery. Stop taking all Aspirin or Aspirin containing products 7 days prior to your surgery. Stop taking Advil, Motrin, Aleve, and etc. 3 days prior to your surgery. 4) If you take any diabetic medications please consult with your primary care physician on how to take them on the day of your surgery 5) Please stop all Herbal products 2 weeks prior to your surgery. 6) Please arrive at the hospital 2 hours prior to your surgery, unless you have been otherwise instructed. 7) Patients having an operation on their colon will be given a separate instruction sheet on their Bowel Prep. 8) For any pre-operative work up check in at the main entrance to \[facility]\"", and then go to Patient Registration. These studies are done on a walk in basis they are open from 7:00am to 5:00pm Monday through Friday. 9) Please wash your surgical site the morning of your surgery with soap and water. 10) If you are of child bearing age you will have pregnancy test done the morning of your surgery as soon as you arrive. 11) You may be contacted to change your surgery time. At times this is necessary due to equipment or staffing needs. After Surgery Instructions: You will need to be seen in the office for a follow-up visit 7-14 days after your surgery. Please call after you have had the procedure to make this appointment. Unless otherwise instructed, you may remove your outer bandage and shower 48 hours after your surgery. If you develop a fever greater than 101, have any significant drainage, bleeding, swelling and/or pus of the wound. Please call our office immediately. Surgery Date and Time:  Thursday, February 15, 2018 at 8:30am 
 
Please check in at [facility], enter through the Emergency Room entrance and go up to the second floor. Please check in by 7:00am the day of your surgery. You may contact Pool Kimble with any questions at [phone]. Patient Instructions History Introducing hospitals & HEALTH SERVICES! Dear Sushil Morales: Thank you for requesting a [employer] account. Our records indicate that you already have an active [employer] account. You can access your account anytime at [email or web address] "[email or web address]"/[employer] Did you know that you can access your hospital and ER discharge instructions at any time in [employer]? You can also review all of your test results from your hospital stay or ER visit. Additional Information If you have questions, please visit the Frequently Asked Questions section of the [employer] website at [email or web address] "[email or web address]"/[email or web address]/. Remember, [employer] is NOT to be used for urgent needs. For medical emergencies, dial 911. Now available from your iPhone and Android! Please provide this summary of care documentation to your next provider. Your primary care clinician is listed as Layo Ramirez. If you have any questions after today's visit, please call [phone].

## 2018-02-13 NOTE — PATIENT INSTRUCTIONS
If you have any questions or concerns about today's appointment, the verbal and/or written instructions you were given for follow up care, please call our office at [phone]. [facility] [place]  [address] [place] [address]    [phone] office  [phone] fax    . PATIENT PRE AND POST OPERATIVE INSTRUCTIONS     [address] [place] [address]  [phone]    Before Surgery Instructions:   1) You must have someone available to drive you to and from your procedure and stay with you for the first 24 hours. 2) It is very important that you have nothing to eat or drink after midnight the night before your surgery. This includes chewing gum or sucking on hard candy. Take only heart, blood pressure and cholesterol medications the morning of surgery with only a sip of water. 3) Please stop taking Plavix 10 days prior to your surgery. Stop taking Coumadin 5 days prior to your surgery. Stop taking all Aspirin or Aspirin containing products 7 days prior to your surgery. Stop taking Advil, Motrin, Aleve, and etc. 3 days prior to your surgery. 4) If you take any diabetic medications please consult with your primary care physician on how to take them on the day of your surgery  5) Please stop all Herbal products 2 weeks prior to your surgery. 6) Please arrive at the hospital 2 hours prior to your surgery, unless you have been otherwise instructed. 7) Patients having an operation on their colon will be given a separate instruction sheet on their Bowel Prep. 8) For any pre-operative work up check in at the main entrance to [address] [place] [address], and then go to Patient Registration. These studies are done on a walk in basis they are open from 7:00am to 5:00pm Monday through Friday. 9) Please wash your surgical site the morning of your surgery with soap and water.   10) If you are of child bearing age you will have pregnancy test done the morning of your surgery as soon as you arrive. 11) You may be contacted to change your surgery time. At times this is necessary due to equipment or staffing needs. After Surgery Instructions: You will need to be seen in the office for a follow-up visit 7-14 days after your surgery. Please call after you have had the procedure to make this appointment. Unless otherwise instructed, you may remove your outer bandage and shower 48 hours after your surgery. If you develop a fever greater than 101, have any significant drainage, bleeding, swelling and/or pus of the wound. Please call our office immediately. Surgery Date and Time:  Thursday, February 15, 2018 at 8:30am    Please check in at [facility], enter through the Emergency Room entrance and go up to the second floor. Please check in by 7:00am the day of your surgery. You may contact Marisol Florence with any questions at [phone].

## 2018-02-13 NOTE — LETTER
2/13/2018 2:59 PM 
 
Patient:  Joss Reyna YOB: 1955 Date of Visit: 2/13/2018 Kashif Silva, [address] [facility] 
[address] [facility] 
[address] with [facility] [address] [address] VIA Facsimile: [phone] Dear LUCIA Mccarthy, Thank you for referring Ms. Bi Hector to [place] [address] for evaluation and treatment. Below are the relevant portions of my assessment and plan of care. Thank you very much for your referral of Ms. Bi Hector. If you have questions, please do not hesitate to call me. I look forward to following Ms. Julieta Mullendebbi along with you and will keep you updated as to her progress. Sincerely, Rajan Lamb MD

## 2018-02-13 NOTE — PROGRESS NOTES
Devon Miguel is a 65 y.o. female who presents for a surgical evaluation of RUQ abdominal pain, which she scores as a 7/10, & cholelithiasis. Patient verbally agrees to permit the medical students working in [address] [place] [address] office to observe and participate in surgical care during the appointment today, including, where appropriate, providing direct surgical care to patient under the physicians direct supervision. Patient agrees that he/she has been given the opportunity to refuse to give such consent and may withdraw consent at any time during appointment.

## 2018-02-13 NOTE — PROGRESS NOTES
General Surgery Consult      Lesa Myers  Admit date: (Not on file)    MRN: [id number]     : 1955     Age: 65 y.o. Attending Physician: Anibal Rosa MD, FACS      Subjective:     Magen Cat is a 65 y.o. female with a history of abdominal pain. The pain has been present for about 2 weeks. The pain is mainly localized in the right upper quadrant. It is associated with nausea but no vomiting. The pain is mainly related to fatty food. She also has some constipation but no diarrhea. The patient  has not had jaundice, acholic stools or dark urine and has not had a history of pancreatitis or hepatitis. Findings of ultrasound of the abdomen: gallstones. Patient Active Problem List    Diagnosis Date Noted    Normal coronary arteries 2016    Palpitations 2016    Numbness 2016    Dyslipidemia 2016    Dizziness 2016    Precordial pain 2016    TIA (transient ischemic attack) 2016    Hypertension 2016    Hypothyroidism 2016    Hyperglycemia 2016    UTI (urinary tract infection) 2016     Past Medical History:   Diagnosis Date    Hypercholesterolemia     Hypertension     Hypothyroid       No past surgical history on file. Social History   Substance Use Topics    Smoking status: Never Smoker    Smokeless tobacco: Never Used    Alcohol use Yes      History   Smoking Status    Never Smoker   Smokeless Tobacco    Never Used     No family history on file. Current Outpatient Prescriptions   Medication Sig    amitriptyline (ELAVIL) 10 mg tablet     metoprolol succinate (TOPROL-XL) 25 mg XL tablet Take 25 mg by mouth daily.  ALPRAZolam (XANAX) 0.5 mg tablet Take 0.5 mg by mouth daily as needed.  cyclobenzaprine (FLEXERIL) 10 mg tablet Take 10 mg by mouth three (3) times daily as needed.  gabapentin (NEURONTIN) 100 mg capsule 200 mg three (3) times daily.  One cap every morning, and 2 caps every night    levothyroxine (SYNTHROID) 25 mcg tablet Take 25 mcg by mouth Daily (before breakfast).  atorvastatin (LIPITOR) 20 mg tablet Take 20 mg by mouth daily.  aspirin (ASPIRIN) 325 mg tablet Take 1 Tab by mouth daily.  omega-3 fatty acids-vitamin e 1,000 mg cap Take 3 Caps by mouth daily.  glucosamine-chondroitin (ARTHX) 500-400 mg cap Take 1 Cap by mouth daily.  calcium 500 mg tab Take 2 Tabs by mouth daily.  ibuprofen (MOTRIN) 400 mg tablet Take 1 Tab by mouth every six (6) hours as needed for Pain. No current facility-administered medications for this visit. No Known Allergies     Review of Systems:  Constitutional: negative  Eyes: negative  Ears, Nose, Mouth, Throat, and Face: negative  Respiratory: negative  Cardiovascular: negative  Gastrointestinal: positive for abdominal pain  Genitourinary:negative  Integument/Breast: negative  Hematologic/Lymphatic: negative  Musculoskeletal:negative  Neurological: negative  Behavioral/Psychiatric: negative  Endocrine: negative  Allergic/Immunologic: negative    Objective:     Visit Vitals    /67 (BP 1 Location: Right arm, BP Patient Position: Sitting)    Pulse 85    Temp 97.2 °F (36.2 °C) (Oral)    Resp 16    Ht 5' 6\" (1.676 m)    Wt 79.8 kg (176 lb)    SpO2 97%    BMI 28.41 kg/m2       Physical Exam:      General:  in no apparent distress, alert, oriented times 3 and afebrile   Eyes:  conjunctivae and sclerae normal, pupils equal, round, reactive to light   Throat & Neck: no erythema or exudates noted and neck supple and symmetrical; no palpable masses   Lungs:   clear to auscultation bilaterally   Heart:  Regular rate and rhythm   Abdomen:   rounded, soft, nontender except for right upper quadrant localized tenderness and localized guarding, nondistended, no masses or organomegaly.  No abdominal wall hernias   Extremities: extremities normal, atraumatic, no cyanosis or edema   Skin: Normal.         Imaging and Lab Review:     CBC:   Lab Results   Component Value Date/Time    WBC 7.3 10/27/2016 06:40 PM    RBC 4.35 10/27/2016 06:40 PM    HGB 13.8 10/27/2016 06:40 PM    HCT 40.2 10/27/2016 06:40 PM    PLATELET 557 83/46/8152 06:40 PM     BMP:   Lab Results   Component Value Date/Time    Glucose 92 10/27/2016 06:40 PM    Sodium 142 10/27/2016 06:40 PM    Potassium 3.9 10/27/2016 06:40 PM    Chloride 105 10/27/2016 06:40 PM    CO2 28 10/27/2016 06:40 PM    BUN 18 10/27/2016 06:40 PM    Creatinine 0.76 10/27/2016 06:40 PM    Calcium 9.5 10/27/2016 06:40 PM     CMP:  Lab Results   Component Value Date/Time    Glucose 92 10/27/2016 06:40 PM    Sodium 142 10/27/2016 06:40 PM    Potassium 3.9 10/27/2016 06:40 PM    Chloride 105 10/27/2016 06:40 PM    CO2 28 10/27/2016 06:40 PM    BUN 18 10/27/2016 06:40 PM    Creatinine 0.76 10/27/2016 06:40 PM    Calcium 9.5 10/27/2016 06:40 PM    Anion gap 9 10/27/2016 06:40 PM    BUN/Creatinine ratio 24 (H) 10/27/2016 06:40 PM    Alk. phosphatase 79 10/20/2016 10:55 AM    Protein, total 7.9 10/20/2016 10:55 AM    Albumin 4.1 10/20/2016 10:55 AM    Globulin 3.8 10/20/2016 10:55 AM    A-G Ratio 1.1 10/20/2016 10:55 AM       No results found for this or any previous visit (from the past 24 hour(s)). images and reports reviewed    Assessment:   Mohan Garcia is a 65 y.o. female is presenting with abdominal pain and a picture of Chronic cholecystitis. I explained the indications for robotic cholecystectomy as well as the alternatives. I discussed the potential risks, including but not limited to bleeding, wound infection, trocar injuries, bile duct injury and leak, and also the possible need for conversion to open procedure. I also explained the firefly technology and how it allow us to visualize the biliary tree to avoid bile duct injury or leak. She indicates that she understands the risks, accepts and wishes to proceed. Plan:     1.  Will schedule for robotic single-site cholecystectomy with firefly (ICG) technology for identification of the biliary tree. 2. No heavy lifting (more than 15 pounds) for 2 weeks after the surgery. 3. Avoid constipation after the surgery (take stool softener).       Please call me if you have any questions (cell phone: [phone])     Signed By: Rob Figueroa MD     February 13, 2018

## 2018-02-14 ENCOUNTER — ANESTHESIA EVENT (OUTPATIENT)
Dept: SURGERY | Age: 63
End: 2018-02-14
Payer: COMMERCIAL

## 2018-02-14 RX ORDER — ASPIRIN 81 MG/1
81 TABLET ORAL DAILY
COMMUNITY

## 2018-02-14 RX ORDER — GABAPENTIN 300 MG/1
300 CAPSULE ORAL 3 TIMES DAILY
COMMUNITY

## 2018-02-15 ENCOUNTER — ANESTHESIA (OUTPATIENT)
Dept: SURGERY | Age: 63
End: 2018-02-15
Payer: COMMERCIAL

## 2018-02-15 ENCOUNTER — HOSPITAL ENCOUNTER (OUTPATIENT)
Age: 63
Setting detail: OUTPATIENT SURGERY
Discharge: HOME | End: 2018-02-15
Attending: SURGERY | Admitting: SURGERY
Payer: COMMERCIAL

## 2018-02-15 VITALS
DIASTOLIC BLOOD PRESSURE: 60 MMHG | WEIGHT: 176 LBS | TEMPERATURE: 97.7 F | HEART RATE: 55 BPM | BODY MASS INDEX: 28.28 KG/M2 | HEIGHT: 66 IN | SYSTOLIC BLOOD PRESSURE: 154 MMHG | OXYGEN SATURATION: 97 % | RESPIRATION RATE: 18 BRPM

## 2018-02-15 DIAGNOSIS — K80.20 GALLSTONES: Primary | ICD-10-CM

## 2018-02-15 PROCEDURE — 77030008771 HC TU NG SALEM SUMP -A: Performed by: NURSE ANESTHETIST, CERTIFIED REGISTERED

## 2018-02-15 PROCEDURE — 77030018842 HC SOL IRR SOD CL 9% BAXT -A: Performed by: SURGERY

## 2018-02-15 PROCEDURE — 74011250636 HC RX REV CODE- 250/636

## 2018-02-15 PROCEDURE — 77030035048 HC TRCR ENDOSC OPTCL COVD -B: Performed by: SURGERY

## 2018-02-15 PROCEDURE — 74011000250 HC RX REV CODE- 250

## 2018-02-15 PROCEDURE — 74011250637 HC RX REV CODE- 250/637: Performed by: NURSE ANESTHETIST, CERTIFIED REGISTERED

## 2018-02-15 PROCEDURE — 77030009852 HC PCH RTVR ENDOSC COVD -B: Performed by: SURGERY

## 2018-02-15 PROCEDURE — 77030008477 HC STYL SATN SLP COVD -A: Performed by: NURSE ANESTHETIST, CERTIFIED REGISTERED

## 2018-02-15 PROCEDURE — 77030035277 HC OBTRTR BLDELSS DISP INTU -B: Performed by: SURGERY

## 2018-02-15 PROCEDURE — 77030032490 HC SLV COMPR SCD KNE COVD -B: Performed by: SURGERY

## 2018-02-15 PROCEDURE — 76010000933 HC OR TIME 0.5 TO 1HR INTENSV - TIER 2: Performed by: SURGERY

## 2018-02-15 PROCEDURE — 77030002933 HC SUT MCRYL J&J -A: Performed by: SURGERY

## 2018-02-15 PROCEDURE — 74011250636 HC RX REV CODE- 250/636: Performed by: NURSE ANESTHETIST, CERTIFIED REGISTERED

## 2018-02-15 PROCEDURE — 77030020703 HC SEAL CANN DISP INTU -B: Performed by: SURGERY

## 2018-02-15 PROCEDURE — 77030013079 HC BLNKT BAIR HGGR 3M -A: Performed by: NURSE ANESTHETIST, CERTIFIED REGISTERED

## 2018-02-15 PROCEDURE — 76210000016 HC OR PH I REC 1 TO 1.5 HR: Performed by: SURGERY

## 2018-02-15 PROCEDURE — 77030010507 HC ADH SKN DERMBND J&J -B: Performed by: SURGERY

## 2018-02-15 PROCEDURE — 88304 TISSUE EXAM BY PATHOLOGIST: CPT | Performed by: SURGERY

## 2018-02-15 PROCEDURE — 77030008683 HC TU ET CUF COVD -A: Performed by: NURSE ANESTHETIST, CERTIFIED REGISTERED

## 2018-02-15 PROCEDURE — 77030011640 HC PAD GRND REM COVD -A: Performed by: SURGERY

## 2018-02-15 PROCEDURE — 76210000021 HC REC RM PH II 0.5 TO 1 HR: Performed by: SURGERY

## 2018-02-15 PROCEDURE — 77030010939 HC CLP LIG TELE -B: Performed by: SURGERY

## 2018-02-15 PROCEDURE — 76060000032 HC ANESTHESIA 0.5 TO 1 HR: Performed by: SURGERY

## 2018-02-15 PROCEDURE — 77030011267 HC ELECTRD BLD COVD -A: Performed by: SURGERY

## 2018-02-15 PROCEDURE — 74011250636 HC RX REV CODE- 250/636: Performed by: SURGERY

## 2018-02-15 RX ORDER — OXYCODONE AND ACETAMINOPHEN 5; 325 MG/1; MG/1
1 TABLET ORAL ONCE
Status: COMPLETED | OUTPATIENT
Start: 2018-02-15 | End: 2018-02-15

## 2018-02-15 RX ORDER — OXYCODONE AND ACETAMINOPHEN 5; 325 MG/1; MG/1
1 TABLET ORAL
Qty: 24 TAB | Refills: 0 | Status: SHIPPED | OUTPATIENT
Start: 2018-02-15 | End: 2019-08-19

## 2018-02-15 RX ORDER — SUCCINYLCHOLINE CHLORIDE 20 MG/ML
INJECTION INTRAMUSCULAR; INTRAVENOUS AS NEEDED
Status: DISCONTINUED | OUTPATIENT
Start: 2018-02-15 | End: 2018-02-15 | Stop reason: HOSPADM

## 2018-02-15 RX ORDER — SODIUM CHLORIDE, SODIUM LACTATE, POTASSIUM CHLORIDE, CALCIUM CHLORIDE 600; 310; 30; 20 MG/100ML; MG/100ML; MG/100ML; MG/100ML
50 INJECTION, SOLUTION INTRAVENOUS CONTINUOUS
Status: DISCONTINUED | OUTPATIENT
Start: 2018-02-16 | End: 2018-02-15 | Stop reason: HOSPADM

## 2018-02-15 RX ORDER — MIDAZOLAM HYDROCHLORIDE 1 MG/ML
INJECTION, SOLUTION INTRAMUSCULAR; INTRAVENOUS AS NEEDED
Status: DISCONTINUED | OUTPATIENT
Start: 2018-02-15 | End: 2018-02-15 | Stop reason: HOSPADM

## 2018-02-15 RX ORDER — DEXAMETHASONE SODIUM PHOSPHATE 4 MG/ML
INJECTION, SOLUTION INTRA-ARTICULAR; INTRALESIONAL; INTRAMUSCULAR; INTRAVENOUS; SOFT TISSUE AS NEEDED
Status: DISCONTINUED | OUTPATIENT
Start: 2018-02-15 | End: 2018-02-15 | Stop reason: HOSPADM

## 2018-02-15 RX ORDER — NEOSTIGMINE METHYLSULFATE 5 MG/5 ML
SYRINGE (ML) INTRAVENOUS AS NEEDED
Status: DISCONTINUED | OUTPATIENT
Start: 2018-02-15 | End: 2018-02-15 | Stop reason: HOSPADM

## 2018-02-15 RX ORDER — ONDANSETRON 2 MG/ML
INJECTION INTRAMUSCULAR; INTRAVENOUS AS NEEDED
Status: DISCONTINUED | OUTPATIENT
Start: 2018-02-15 | End: 2018-02-15 | Stop reason: HOSPADM

## 2018-02-15 RX ORDER — FENTANYL CITRATE 50 UG/ML
INJECTION, SOLUTION INTRAMUSCULAR; INTRAVENOUS AS NEEDED
Status: DISCONTINUED | OUTPATIENT
Start: 2018-02-15 | End: 2018-02-15 | Stop reason: HOSPADM

## 2018-02-15 RX ORDER — FAMOTIDINE 20 MG/1
TABLET, FILM COATED ORAL
Status: DISCONTINUED
Start: 2018-02-15 | End: 2018-02-15 | Stop reason: HOSPADM

## 2018-02-15 RX ORDER — FENTANYL CITRATE 50 UG/ML
50 INJECTION, SOLUTION INTRAMUSCULAR; INTRAVENOUS AS NEEDED
Status: DISCONTINUED | OUTPATIENT
Start: 2018-02-15 | End: 2018-02-15 | Stop reason: HOSPADM

## 2018-02-15 RX ORDER — HYDROMORPHONE HYDROCHLORIDE 2 MG/ML
0.5 INJECTION, SOLUTION INTRAMUSCULAR; INTRAVENOUS; SUBCUTANEOUS
Status: DISCONTINUED | OUTPATIENT
Start: 2018-02-15 | End: 2018-02-15 | Stop reason: HOSPADM

## 2018-02-15 RX ORDER — SODIUM CHLORIDE 0.9 % (FLUSH) 0.9 %
5-10 SYRINGE (ML) INJECTION AS NEEDED
Status: DISCONTINUED | OUTPATIENT
Start: 2018-02-15 | End: 2018-02-15 | Stop reason: HOSPADM

## 2018-02-15 RX ORDER — GLYCOPYRROLATE 0.2 MG/ML
INJECTION INTRAMUSCULAR; INTRAVENOUS AS NEEDED
Status: DISCONTINUED | OUTPATIENT
Start: 2018-02-15 | End: 2018-02-15 | Stop reason: HOSPADM

## 2018-02-15 RX ORDER — ONDANSETRON 2 MG/ML
4 INJECTION INTRAMUSCULAR; INTRAVENOUS ONCE
Status: DISCONTINUED | OUTPATIENT
Start: 2018-02-15 | End: 2018-02-15 | Stop reason: HOSPADM

## 2018-02-15 RX ORDER — CEFAZOLIN SODIUM 2 G/50ML
2 SOLUTION INTRAVENOUS
Status: COMPLETED | OUTPATIENT
Start: 2018-02-15 | End: 2018-02-15

## 2018-02-15 RX ORDER — MAGNESIUM SULFATE 100 %
4 CRYSTALS MISCELLANEOUS AS NEEDED
Status: DISCONTINUED | OUTPATIENT
Start: 2018-02-15 | End: 2018-02-15 | Stop reason: HOSPADM

## 2018-02-15 RX ORDER — HYDROGEN PEROXIDE 3 %
SOLUTION, NON-ORAL MISCELLANEOUS DAILY
COMMUNITY
End: 2022-04-13

## 2018-02-15 RX ORDER — PROPOFOL 10 MG/ML
INJECTION, EMULSION INTRAVENOUS AS NEEDED
Status: DISCONTINUED | OUTPATIENT
Start: 2018-02-15 | End: 2018-02-15 | Stop reason: HOSPADM

## 2018-02-15 RX ORDER — SODIUM CHLORIDE 0.9 % (FLUSH) 0.9 %
5-10 SYRINGE (ML) INJECTION EVERY 8 HOURS
Status: DISCONTINUED | OUTPATIENT
Start: 2018-02-15 | End: 2018-02-15 | Stop reason: HOSPADM

## 2018-02-15 RX ORDER — INSULIN LISPRO 100 [IU]/ML
INJECTION, SOLUTION INTRAVENOUS; SUBCUTANEOUS ONCE
Status: DISCONTINUED | OUTPATIENT
Start: 2018-02-16 | End: 2018-02-15 | Stop reason: HOSPADM

## 2018-02-15 RX ORDER — KETOROLAC TROMETHAMINE 30 MG/ML
INJECTION, SOLUTION INTRAMUSCULAR; INTRAVENOUS AS NEEDED
Status: DISCONTINUED | OUTPATIENT
Start: 2018-02-15 | End: 2018-02-15 | Stop reason: HOSPADM

## 2018-02-15 RX ORDER — DEXTROSE 50 % IN WATER (D50W) INTRAVENOUS SYRINGE
25-50 AS NEEDED
Status: DISCONTINUED | OUTPATIENT
Start: 2018-02-15 | End: 2018-02-15 | Stop reason: HOSPADM

## 2018-02-15 RX ORDER — NALOXONE HYDROCHLORIDE 0.4 MG/ML
0.04 INJECTION, SOLUTION INTRAMUSCULAR; INTRAVENOUS; SUBCUTANEOUS AS NEEDED
Status: DISCONTINUED | OUTPATIENT
Start: 2018-02-15 | End: 2018-02-15 | Stop reason: HOSPADM

## 2018-02-15 RX ORDER — SODIUM CHLORIDE, SODIUM LACTATE, POTASSIUM CHLORIDE, CALCIUM CHLORIDE 600; 310; 30; 20 MG/100ML; MG/100ML; MG/100ML; MG/100ML
125 INJECTION, SOLUTION INTRAVENOUS CONTINUOUS
Status: DISCONTINUED | OUTPATIENT
Start: 2018-02-15 | End: 2018-02-15 | Stop reason: HOSPADM

## 2018-02-15 RX ORDER — LIDOCAINE HYDROCHLORIDE 20 MG/ML
INJECTION, SOLUTION EPIDURAL; INFILTRATION; INTRACAUDAL; PERINEURAL AS NEEDED
Status: DISCONTINUED | OUTPATIENT
Start: 2018-02-15 | End: 2018-02-15 | Stop reason: HOSPADM

## 2018-02-15 RX ORDER — FAMOTIDINE 20 MG/1
20 TABLET, FILM COATED ORAL ONCE
Status: COMPLETED | OUTPATIENT
Start: 2018-02-16 | End: 2018-02-15

## 2018-02-15 RX ADMIN — SUCCINYLCHOLINE CHLORIDE 100 MG: 20 INJECTION INTRAMUSCULAR; INTRAVENOUS at 08:36

## 2018-02-15 RX ADMIN — FENTANYL CITRATE 50 MCG: 50 INJECTION, SOLUTION INTRAMUSCULAR; INTRAVENOUS at 09:50

## 2018-02-15 RX ADMIN — CEFAZOLIN SODIUM 2 G: 2 SOLUTION INTRAVENOUS at 08:39

## 2018-02-15 RX ADMIN — DEXAMETHASONE SODIUM PHOSPHATE 4 MG: 4 INJECTION, SOLUTION INTRA-ARTICULAR; INTRALESIONAL; INTRAMUSCULAR; INTRAVENOUS; SOFT TISSUE at 08:43

## 2018-02-15 RX ADMIN — PROPOFOL 150 MG: 10 INJECTION, EMULSION INTRAVENOUS at 08:36

## 2018-02-15 RX ADMIN — LIDOCAINE HYDROCHLORIDE 100 MG: 20 INJECTION, SOLUTION EPIDURAL; INFILTRATION; INTRACAUDAL; PERINEURAL at 08:36

## 2018-02-15 RX ADMIN — KETOROLAC TROMETHAMINE 30 MG: 30 INJECTION, SOLUTION INTRAMUSCULAR; INTRAVENOUS at 09:02

## 2018-02-15 RX ADMIN — SODIUM CHLORIDE, POTASSIUM CHLORIDE, SODIUM LACTATE AND CALCIUM CHLORIDE 50 ML/HR: 600; 310; 30; 20 INJECTION, SOLUTION INTRAVENOUS at 07:16

## 2018-02-15 RX ADMIN — GLYCOPYRROLATE 0.2 MG: 0.2 INJECTION INTRAMUSCULAR; INTRAVENOUS at 09:08

## 2018-02-15 RX ADMIN — GLYCOPYRROLATE 0.2 MG: 0.2 INJECTION INTRAMUSCULAR; INTRAVENOUS at 08:41

## 2018-02-15 RX ADMIN — MIDAZOLAM HYDROCHLORIDE 2 MG: 1 INJECTION, SOLUTION INTRAMUSCULAR; INTRAVENOUS at 08:28

## 2018-02-15 RX ADMIN — OXYCODONE HYDROCHLORIDE AND ACETAMINOPHEN 1 TABLET: 5; 325 TABLET ORAL at 11:08

## 2018-02-15 RX ADMIN — ONDANSETRON 4 MG: 2 INJECTION INTRAMUSCULAR; INTRAVENOUS at 08:43

## 2018-02-15 RX ADMIN — FAMOTIDINE 20 MG: 20 TABLET, FILM COATED ORAL at 07:17

## 2018-02-15 RX ADMIN — Medication 2.5 MG: at 09:08

## 2018-02-15 RX ADMIN — FENTANYL CITRATE 100 MCG: 50 INJECTION, SOLUTION INTRAMUSCULAR; INTRAVENOUS at 08:36

## 2018-02-15 NOTE — DISCHARGE INSTRUCTIONS
Instructions Following Ambulatory Surgery    Patient: Yovani Saucedo MRN: [id number]  SSN: [id number]    YOB: 1955  Age: 65 y.o. Sex: female      Activity  · As tolerated, walking encourage, stairs are okay  · Avoid strenuous activities - no lifting anything heavier than 15 pounds. · You may shower at home after 48 hours. Diet  · Regular diet after nausea from the anesthetic has passed. Pain  · Take pain medication as directed by your doctor  ·  Call your doctor if pain is NOT relieved by medication    Dressing Care  · Remove outer dressing (if any) after 48 hours. Leave steri-strips (if any) in place until they fall off. After Anesthesia  · For the first 24 hours: DO NOT Drive, Drink alcoholic beverages, or Make important decisions  · Be aware of dizziness following anesthesia and while taking pain medication    Call your doctor if  · Excessive bleeding that does not stop after holding mild pressure over the area  · Temperature of 101 degrees F or above  · Redness,excessive swelling or bruising, and/or green or yellow, smelly discharge from incision  · If nausea and vomiting continues    Follow-Up Phone Calls  · Call the office at [phone] to make your follow-up appointment    Appointment date/time: 2 weeks after the surgery. Dr. Immanuel Petty cell phone number is [phone]. Please call me if you have any concerns or questions. DISCHARGE SUMMARY from Nurse    PATIENT INSTRUCTIONS:    After general anesthesia or intravenous sedation, for 24 hours or while taking prescription Narcotics:  · Limit your activities  · Do not drive and operate hazardous machinery  · Do not make important personal or business decisions  · Do  not drink alcoholic beverages  · If you have not urinated within 8 hours after discharge, please contact your surgeon on call.     Report the following to your surgeon:  · Excessive pain, swelling, redness or odor of or around the surgical area  · Temperature over 100.5  · Nausea and vomiting lasting longer than 4 hours or if unable to take medications  · Any signs of decreased circulation or nerve impairment to extremity: change in color, persistent  numbness, tingling, coldness or increase pain  · Any questions    What to do at Home:    These are general instructions for a healthy lifestyle:    No smoking/ No tobacco products/ Avoid exposure to second hand smoke  Surgeon General's Warning:  Quitting smoking now greatly reduces serious risk to your health. Obesity, smoking, and sedentary lifestyle greatly increases your risk for illness    A healthy diet, regular physical exercise & weight monitoring are important for maintaining a healthy lifestyle    You may be retaining fluid if you have a history of heart failure or if you experience any of the following symptoms:  Weight gain of 3 pounds or more overnight or 5 pounds in a week, increased swelling in our hands or feet or shortness of breath while lying flat in bed. Please call your doctor as soon as you notice any of these symptoms; do not wait until your next office visit. Recognize signs and symptoms of STROKE:    F-face looks uneven    A-arms unable to move or move unevenly    S-speech slurred or non-existent    T-time-call 911 as soon as signs and symptoms begin-DO NOT go       Back to bed or wait to see if you get better-TIME IS BRAIN. Warning Signs of HEART ATTACK     Call 911 if you have these symptoms:   Chest discomfort. Most heart attacks involve discomfort in the center of the chest that lasts more than a few minutes, or that goes away and comes back. It can feel like uncomfortable pressure, squeezing, fullness, or pain.  Discomfort in other areas of the upper body. Symptoms can include pain or discomfort in one or both arms, the back, neck, jaw, or stomach.  Shortness of breath with or without chest discomfort.    Other signs may include breaking out in a cold sweat, nausea, or lightheadedness. Don't wait more than five minutes to call 911 - MINUTES MATTER! Fast action can save your life. Calling 911 is almost always the fastest way to get lifesaving treatment. Emergency Medical Services staff can begin treatment when they arrive -- up to an hour sooner than if someone gets to the hospital by car. The discharge information has been reviewed with the patient. The patient verbalized understanding. Discharge medications reviewed with the patient and appropriate educational materials and side effects teaching were provided. ___________________________________________________________________________________________________________________________________    Patient armband removed and given to patient to take home. Patient was informed of the privacy risks if armband lost or stolen  .

## 2018-02-15 NOTE — IP AVS SNAPSHOT
Judith Navarrete 
 
 
 [address] 
[phone] Patient: Sarah Farncis MRN: [id number] CXF:[birth year] About your hospitalization You were admitted on:  February 15, 2018 You last received care in the:  [facility] PHASE 2 RECOVERY You were discharged on:  February 15, 2018 Why you were hospitalized Your primary diagnosis was:  Not on File Follow-up Information Follow up With Details Comments Contact Info Ramiro Dawkins [place] [address] [address] [facility] 
[address] [facility] [id number] [phone] Your Scheduled Appointments Wednesday March 07, 2018 11:15 AM EST  
POST OP with Ok Jsoue MD  
[address] [address] [facility] [address] [facility] [phone] [id number]  
[phone] Discharge Orders None A check eber indicates which time of day the medication should be taken. My Medications START taking these medications Instructions Each Dose to Equal  
 Morning Noon Evening Bedtime  
 oxyCODONE-acetaminophen 5-325 mg per tablet Commonly known as:  PERCOCET Your last dose was: Your next dose is: Take 1 Tab by mouth every four (4) hours as needed for Pain. Max Daily Amount: 6 Tabs. 1 Tab CONTINUE taking these medications Instructions Each Dose to Equal  
 Morning Noon Evening Bedtime  
 amitriptyline 10 mg tablet Commonly known as:  ELAVIL Your last dose was: Your next dose is: Take 10 mg by mouth nightly. 10 mg  
    
   
   
   
  
 aspirin delayed-release 81 mg tablet Your last dose was: Your next dose is: Take 81 mg by mouth daily. 81 mg  
    
   
   
   
  
 atorvastatin 20 mg tablet Commonly known as:  LIPITOR Your last dose was: Your next dose is: Take 20 mg by mouth daily. 20 mg  
    
   
   
   
  
 calcium 500 mg Tab Your last dose was: Your next dose is: Take 2 Tabs by mouth daily. 2 Tab  
    
   
   
   
  
 gabapentin 300 mg capsule Commonly known as:  NEURONTIN Your last dose was: Your next dose is: Take 300 mg by mouth three (3) times daily. 300 mg  
    
   
   
   
  
 glucosamine-chondroitin 500-400 mg Cap Commonly known as:  20 [facility] Your last dose was: Your next dose is: Take 2 Caps by mouth daily. 2 Cap  
    
   
   
   
  
 ibuprofen 400 mg tablet Commonly known as:  MOTRIN Your last dose was: Your next dose is: Take 1 Tab by mouth every six (6) hours as needed for Pain. 400 mg  
    
   
   
   
  
 levothyroxine 25 mcg tablet Commonly known as:  SYNTHROID Your last dose was: Your next dose is: Take 25 mcg by mouth Daily (before breakfast). 25 mcg  
    
   
   
   
  
 metoprolol succinate 25 mg XL tablet Commonly known as:  TOPROL-XL Your last dose was: Your next dose is: Take 25 mg by mouth daily. 25 mg NexIUM 20 mg capsule Generic drug:  esomeprazole Your last dose was: Your next dose is: Take  by mouth daily. ASK your doctor about these medications Instructions Each Dose to Equal  
 Morning Noon Evening Bedtime  
 omega-3 fatty acids-vitamin e 1,000 mg Cap Your last dose was: Your next dose is: Take 3 Caps by mouth daily. 3 Cap Where to Get Your Medications Information on where to get these meds will be given to you by the nurse or doctor. ! Ask your nurse or doctor about these medications  
  oxyCODONE-acetaminophen 5-325 mg per tablet Discharge Instructions Instructions Following Ambulatory Surgery Patient: Ting Givens MRN: [id number]  SSN: [id number] YOB: 1955  Age: 65 y.o. Sex: female Activity · As tolerated, walking encourage, stairs are okay · Avoid strenuous activities - no lifting anything heavier than 15 pounds. · You may shower at home after 48 hours. Diet · Regular diet after nausea from the anesthetic has passed. Pain · Take pain medication as directed by your doctor ·  Call your doctor if pain is NOT relieved by medication Dressing Care · Remove outer dressing (if any) after 48 hours. Leave steri-strips (if any) in place until they fall off. After Anesthesia · For the first 24 hours: DO NOT Drive, Drink alcoholic beverages, or Make important decisions · Be aware of dizziness following anesthesia and while taking pain medication Call your doctor if 
· Excessive bleeding that does not stop after holding mild pressure over the area · Temperature of 101 degrees F or above · Redness,excessive swelling or bruising, and/or green or yellow, smelly discharge from incision · If nausea and vomiting continues Follow-Up Phone Calls · Call the office at [phone] to make your follow-up appointment Appointment date/time: 2 weeks after the surgery. Dr. Deepak Morrison cell phone number is [phone]. Please call me if you have any concerns or questions. DISCHARGE SUMMARY from Nurse PATIENT INSTRUCTIONS: 
 
After general anesthesia or intravenous sedation, for 24 hours or while taking prescription Narcotics: · Limit your activities · Do not drive and operate hazardous machinery · Do not make important personal or business decisions · Do  not drink alcoholic beverages · If you have not urinated within 8 hours after discharge, please contact your surgeon on call. Report the following to your surgeon: 
· Excessive pain, swelling, redness or odor of or around the surgical area · Temperature over 100.5 · Nausea and vomiting lasting longer than 4 hours or if unable to take medications · Any signs of decreased circulation or nerve impairment to extremity: change in color, persistent  numbness, tingling, coldness or increase pain · Any questions What to do at Home: These are general instructions for a healthy lifestyle: No smoking/ No tobacco products/ Avoid exposure to second hand smoke Surgeon General's Warning:  Quitting smoking now greatly reduces serious risk to your health. Obesity, smoking, and sedentary lifestyle greatly increases your risk for illness A healthy diet, regular physical exercise & weight monitoring are important for maintaining a healthy lifestyle You may be retaining fluid if you have a history of heart failure or if you experience any of the following symptoms:  Weight gain of 3 pounds or more overnight or 5 pounds in a week, increased swelling in our hands or feet or shortness of breath while lying flat in bed. Please call your doctor as soon as you notice any of these symptoms; do not wait until your next office visit. Recognize signs and symptoms of STROKE: 
 
F-face looks uneven A-arms unable to move or move unevenly S-speech slurred or non-existent T-time-call 911 as soon as signs and symptoms begin-DO NOT go Back to bed or wait to see if you get better-TIME IS BRAIN. Warning Signs of HEART ATTACK Call 911 if you have these symptoms: 
? Chest discomfort. Most heart attacks involve discomfort in the center of the chest that lasts more than a few minutes, or that goes away and comes back. It can feel like uncomfortable pressure, squeezing, fullness, or pain. ? Discomfort in other areas of the upper body. Symptoms can include pain or discomfort in one or both arms, the back, neck, jaw, or stomach. ? Shortness of breath with or without chest discomfort. ? Other signs may include breaking out in a cold sweat, nausea, or lightheadedness. Don't wait more than five minutes to call [address]! Fast action can save your life. Calling 911 is almost always the fastest way to get lifesaving treatment. Emergency Medical Services staff can begin treatment when they arrive  up to an hour sooner than if someone gets to the hospital by car. The discharge information has been reviewed with the patient. The patient verbalized understanding. Discharge medications reviewed with the patient and appropriate educational materials and side effects teaching were provided. ___________________________________________________________________________________________________________________________________ Patient armband removed and given to patient to take home. Patient was informed of the privacy risks if armband lost or stolen Rizwana Morales Introducing [facility]! Dear Talat Wyman: Thank you for requesting a [employer] account. Our records indicate that you already have an active [employer] account. You can access your account anytime at [email or web address] Did you know that you can access your hospital and ER discharge instructions at any time in [employer]? You can also review all of your test results from your hospital stay or ER visit. Additional Information If you have questions, please visit the Frequently Asked Questions section of the [employer] website at [email or web address] Remember, [employer] is NOT to be used for urgent needs. For medical emergencies, dial 911. Now available from your iPhone and Android! Providers Seen During Your Hospitalization Provider Specialty Primary office phone Ivette Yoder MD Surgery [phone] Your Primary Care Physician (PCP) Primary Care Physician Office Phone Office Fax [place] [phone] You are allergic to the following No active allergies Recent Documentation Height Weight BMI OB Status Smoking Status 1.676 m 79.8 kg 28.41 kg/m2 Postmenopausal Former Smoker Emergency Contacts Name Discharge Info Relation Home Work Mobile [address] 3D CAREGIVER [3] Spouse [3]   [phone] Patient Belongings The following personal items are in your possession at time of discharge: 
  Dental Appliances: None  Visual Aid: Glasses      Home Medications: None   Jewelry: None  Clothing: Undergarments, Socks, Footwear, Shirt, Pants    Other Valuables: Eyeglasses Please provide this summary of care documentation to your next provider. Signatures-by signing, you are acknowledging that this After Visit Summary has been reviewed with you and you have received a copy. Patient Signature:  ____________________________________________________________ Date:  ____________________________________________________________  
  
[facility] Provider Signature:  ____________________________________________________________ Date:  ____________________________________________________________

## 2018-02-15 NOTE — OP NOTES
[facility]  OPERATIVE REPORT    Chaparrita Drain  MR#: [id number]  : 1955  ACCOUNT #: [de-identified]   DATE OF SERVICE: 02/15/2018    SURGEON:   Ford Duke MD.    PREOPERATIVE DIAGNOSIS:  Chronic cholecystitis. POSTOPERATIVE DIAGNOSIS:  Chronic cholecystitis. PROCEDURE PERFORMED:  Robotic cholecystectomy with Firefly to identify the biliary tree. ANESTHESIA:  General.    COMPLICATIONS:  None. SPECIMENS REMOVED:  Gallbladder. BLOOD LOSS:  Minimal.    DETAIL OF PROCEDURE:  The patient was brought to operating room. Anesthesia was induced. Scrubbing and draping of the abdomen was done in the usual manner. A timeout was performed. A skin incision in the supraumbilical area was performed. Veress needle was inserted. Abdomen was insufflated. Eight mm port was inserted. At this point, two other 8 mm ports were placed on the left and right side of the abdominal wall and the 5 mm port was placed in the right upper quadrant. The robot was docked. The gallbladder was retracted cephalad. There was adhesion between the omentum and the gallbladder. These were taken out with electrocautery. The cystic duct and cystic artery were dissected. Firefly was used to identify the cystic duct with the cystic artery. The cystic duct was clipped and divided. The cystic artery was cauterized. The gallbladder was taken out from the liver bed using electrocautery. Hemostasis was secured. The gallbladder was placed in an EndoCatch and was removed through the supraumbilical incision. At this point, the skin incisions were closed with 4-0 Monocryl.       MD Júnior Nolasco  D: 02/15/2018 11:02     T: 02/15/2018 12:27  JOB #: [id number]

## 2018-02-15 NOTE — BRIEF OP NOTE
BRIEF OPERATIVE NOTE    Date of Procedure: 2/15/2018   Preoperative Diagnosis: gallstones k80.20 ruq pain  r10.11  Postoperative Diagnosis: gallstones k80.20 ruq pain  r10.11    Procedure(s):  davinci MULTIPORT CHOLECYSTECTOMY  ROBOTIC ASSISTED XI   Surgeon(s) and Role:     * Rob Figueroa MD - Primary         Assistant Staff: None      Surgical Staff:  Circ-1: Deysi Concepcion RN  Scrub Tech-1: [facility]  Surg Asst-1: Sarah Lunch  Event Time In   Incision Start 1413   Incision Close      Anesthesia: General   Estimated Blood Loss: Minimal  Specimens:   ID Type Source Tests Collected by Time Destination   1 : Gallbladder Preservative Gallbladder  Rob Figueroa MD 2/15/2018 6359 Pathology      Findings: Stones and adhesions   Complications: None  Implants: * No implants in log *

## 2018-02-15 NOTE — ANESTHESIA POSTPROCEDURE EVALUATION
Post-Anesthesia Evaluation & Assessment    Visit Vitals    /55    Pulse (!) 54    Temp 36.5 °C (97.7 °F)    Resp 12    Ht 5' 6\" (1.676 m)    Wt 79.8 kg (176 lb)    SpO2 94%    BMI 28.41 kg/m2       Nausea/Vomiting: no nausea and no vomiting    Post-operative hydration adequate. Pain score (VAS): 3    Mental status & Level of consciousness: alert and oriented x 3    Neurological status: moves all extremities, sensation grossly intact    Pulmonary status: airway patent, no supplemental oxygen required    Complications related to anesthesia: none    Patient has met all discharge requirements.     Additional comments:        Anjelica Mujica, CRNA

## 2018-02-15 NOTE — PROGRESS NOTES
Date of Surgery Update:  Yovani Saucedo was seen and examined. History and physical has been reviewed. The patient has been examined.  There have been no significant clinical changes since the completion of the originally dated History and Physical. Will proceed with robotic cholecystectomy (not single-site since we have the Xi system)    Signed By: Mallika Morales MD     February 15, 2018 8:10 AM

## 2018-02-15 NOTE — ANESTHESIA POSTPROCEDURE EVALUATION
Post-Anesthesia Evaluation and Assessment    Patient: Sid Walters MRN: [id number]  SSN: [id number]    YOB: 1955  Age: 65 y.o. Sex: female       Cardiovascular Function/Vital Signs  Visit Vitals    /58    Pulse 62    Temp 36.5 °C (97.7 °F)    Resp 18    Ht 5' 6\" (1.676 m)    Wt 79.8 kg (176 lb)    SpO2 98%    BMI 28.41 kg/m2       Patient is status post general anesthesia for Procedure(s):  davinci MULTIPORT CHOLECYSTECTOMY  ROBOTIC ASSISTED XI . Nausea/Vomiting: None    Postoperative hydration reviewed and adequate. Pain:  Pain Scale 1: Numeric (0 - 10) (02/15/18 1004)  Pain Intensity 1: 4 (02/15/18 1004)   Managed    Neurological Status:   Neuro (WDL): Within Defined Limits (02/15/18 0949)   At baseline    Mental Status and Level of Consciousness: Alert and oriented     Pulmonary Status:   O2 Device: Room air (02/15/18 0949)   Adequate oxygenation and airway patent    Complications related to anesthesia: None    Post-anesthesia assessment completed.  No concerns    Signed By: Sanket Bates CRNA     February 15, 2018

## 2018-02-15 NOTE — ANESTHESIA PREPROCEDURE EVALUATION
Anesthetic History   No history of anesthetic complications            Review of Systems / Medical History  Patient summary reviewed and pertinent labs reviewed    Pulmonary  Within defined limits                 Neuro/Psych         TIA     Cardiovascular    Hypertension: well controlled              Exercise tolerance: >4 METS     GI/Hepatic/Renal     GERD: well controlled           Endo/Other      Hypothyroidism: well controlled       Other Findings   Comments: Documentation of current medication  Current medications obtained, documented and obtained? YES      Risk Factors for Postoperative nausea/vomiting:       History of postoperative nausea/vomiting? NO       Female? YES       Motion sickness? NO       Intended opioid administration for postoperative analgesia? YES      Smoking Abstinence:  Current Smoker? NO  Elective Surgery? YES  Seen preoperatively by anesthesiologist or proxy prior to day of surgery? YES  Pt abstained from smoking 24 hours prior to anesthesia?  N/A    Preventive care/screening for High Blood Pressure:  Aged 18 years and older: YES  Screened for high blood pressure: YES  Patients with high blood pressure referred to primary care provider   for BP management: YES                 Physical Exam    Airway  Mallampati: III  TM Distance: 4 - 6 cm  Neck ROM: decreased range of motion   Mouth opening: Normal     Cardiovascular    Rhythm: regular  Rate: normal         Dental  No notable dental hx       Pulmonary  Breath sounds clear to auscultation               Abdominal  GI exam deferred       Other Findings            Anesthetic Plan    ASA: 2  Anesthesia type: general          Induction: Intravenous  Anesthetic plan and risks discussed with: Patient

## 2018-03-07 ENCOUNTER — OFFICE VISIT (OUTPATIENT)
Dept: SURGERY | Age: 63
End: 2018-03-07

## 2018-03-07 VITALS
TEMPERATURE: 97.1 F | HEART RATE: 117 BPM | RESPIRATION RATE: 18 BRPM | BODY MASS INDEX: 28.67 KG/M2 | HEIGHT: 66 IN | DIASTOLIC BLOOD PRESSURE: 77 MMHG | SYSTOLIC BLOOD PRESSURE: 166 MMHG | WEIGHT: 178.4 LBS | OXYGEN SATURATION: 98 %

## 2018-03-07 DIAGNOSIS — Z09 POSTOPERATIVE EXAMINATION: Primary | ICD-10-CM

## 2018-03-07 NOTE — PATIENT INSTRUCTIONS
If you have any questions or concerns about today's appointment, the verbal and/or written instructions you were given for follow up care, please call our office at [phone].     [facility] - [address]    [phone] office  [phone]

## 2018-03-07 NOTE — PROGRESS NOTES
Bev Regan is a 65 y.o. female who presents today for a post-op exam for a robotic single-site cholecystectomy performed on 02/15/18. Patient scores their post-op pain level on a pain scale from 1-10 as a 0 today. 1. Have you been to the ER, urgent care clinic since your last visit? Hospitalized since your last visit? No    2. Have you seen or consulted any other health care providers outside of the [address] [place] [address] since your last visit? Include any pap smears or colon screening. No     Patient verbally agrees to permit the medical students working in [address] [place] [address] office to observe and participate in surgical care during the appointment today, including, where appropriate, providing direct surgical care to patient under the physicians direct supervision. Patient agrees that he/she has been given the opportunity to refuse to give such consent and may withdraw consent at any time during appointment.

## 2018-03-07 NOTE — PROGRESS NOTES
Patient seen and examined. She's doing well. Tolerating a regular diet. Abdomen is soft and nontender. Pathology showed cholelithiasis.   Followup as needed

## 2018-03-07 NOTE — LETTER
3/7/2018 11:25 AM 
 
Patient:  Rula Daniels YOB: 1955 Date of Visit: 3/7/2018 Papo Martinez, [address] 
[address] [facility],5Th Floor 
[address] [facility] [address] [address] VIA Facsimile: [phone] Dear LUCIA Esteves, Thank you for referring Ms. Campbell Barthel to [place] [address] for evaluation and treatment. Below are the relevant portions of my assessment and plan of care. Thank you very much for your referral of Ms. Campbell Barthel. If you have questions, please do not hesitate to call me. I look forward to following Ms. Julieta Long along with you and will keep you updated as to her progress. Sincerely, Abby Gamez MD

## 2018-06-08 ENCOUNTER — HOSPITAL ENCOUNTER (OUTPATIENT)
Dept: CT IMAGING | Age: 63
Discharge: HOME | End: 2018-06-08
Attending: INTERNAL MEDICINE
Payer: COMMERCIAL

## 2018-06-08 DIAGNOSIS — D47.2 MGUS (MONOCLONAL GAMMOPATHY OF UNKNOWN SIGNIFICANCE): ICD-10-CM

## 2018-06-08 PROCEDURE — 71250 CT THORAX DX C-: CPT

## 2018-06-14 ENCOUNTER — TELEPHONE (OUTPATIENT)
Dept: CARDIOLOGY CLINIC | Age: 63
End: 2018-06-14

## 2019-08-19 ENCOUNTER — ANESTHESIA EVENT (OUTPATIENT)
Dept: ENDOSCOPY | Age: 64
End: 2019-08-19
Payer: COMMERCIAL

## 2019-08-19 RX ORDER — AMITRIPTYLINE HYDROCHLORIDE 10 MG/1
10 TABLET, FILM COATED ORAL
COMMUNITY

## 2019-08-19 NOTE — PERIOP NOTES
PAT - SURGICAL PRE-ADMISSION INSTRUCTIONS    NAME:  Radha Darnell                                                          TODAY'S DATE:  8/19/2019    SURGERY DATE:  8/20/2019                                  SURGERY ARRIVAL TIME:   0700    1. Do NOT eat or drink anything, including candy or gum, after MIDNIGHT on 08/19/2019 , unless you have specific instructions from your Surgeon or Anesthesia Provider to do so. 2. No smoking on the day of surgery. 3. No alcohol 24 hours prior to the day of surgery. 4. No recreational drugs for one week prior to the day of surgery. 5. Leave all valuables, including money/purse, at home. 6. Remove all jewelry, nail polish, makeup (including mascara); no lotions, powders, deodorant, or perfume/cologne/after shave. 7. Glasses/Contact lenses and Dentures may be worn to the hospital.  They will be removed prior to surgery. 8. Call your doctor if symptoms of a cold or illness develop within 24 ours prior to surgery. 9. AN ADULT MUST DRIVE YOU HOME AFTER OUTPATIENT SURGERY. 10. If you are having an OUTPATIENT procedure, please make arrangements for a responsible adult to be with you for 24 hours after your surgery. 11. If you are admitted to the hospital, you will be assigned to a bed after surgery is complete. Normally a family member will not be able to see you until you are in your assigned bed. 15. Family is encouraged to accompany you to the hospital.  We ask visitors in the treatment area to be limited to ONE person at a time to ensure patient privacy. EXCEPTIONS WILL BE MADE AS NEEDED. 15. Children under 12 are discouraged from entering the treatment area and need to be supervised by an adult when in the waiting room. Special Instructions: Take these medications the morning of surgery with a sip of water:  Metoprolol, Synthroid  Follow Bowel Prep Instructions.         These surgical instructions were reviewed with Gabriel Brown during the PAT phone rashad.      Directions: On the morning of surgery, please go to the [facility]. Enter the building from the [facility] entrance, 1st floor (next to the Emergency Room entrance). Take the elevator to the 2nd floor. Sign in at the Registration Desk.     If you have any questions and/or concerns, please do not hesitate to call:  (Prior to the day of surgery)  [facility] unit:  [phone]  (Day of surgery)  [facility] unit:  [phone]

## 2019-08-20 ENCOUNTER — HOSPITAL ENCOUNTER (OUTPATIENT)
Age: 64
Setting detail: OUTPATIENT SURGERY
Discharge: HOME | End: 2019-08-20
Attending: INTERNAL MEDICINE | Admitting: INTERNAL MEDICINE
Payer: COMMERCIAL

## 2019-08-20 ENCOUNTER — ANESTHESIA (OUTPATIENT)
Dept: ENDOSCOPY | Age: 64
End: 2019-08-20
Payer: COMMERCIAL

## 2019-08-20 VITALS
HEART RATE: 70 BPM | SYSTOLIC BLOOD PRESSURE: 137 MMHG | BODY MASS INDEX: 29.17 KG/M2 | DIASTOLIC BLOOD PRESSURE: 70 MMHG | HEIGHT: 66 IN | TEMPERATURE: 97 F | OXYGEN SATURATION: 99 % | RESPIRATION RATE: 16 BRPM | WEIGHT: 181.5 LBS

## 2019-08-20 PROCEDURE — 74011250636 HC RX REV CODE- 250/636

## 2019-08-20 PROCEDURE — 76060000032 HC ANESTHESIA 0.5 TO 1 HR: Performed by: INTERNAL MEDICINE

## 2019-08-20 PROCEDURE — 76040000007: Performed by: INTERNAL MEDICINE

## 2019-08-20 PROCEDURE — 74011250636 HC RX REV CODE- 250/636: Performed by: NURSE ANESTHETIST, CERTIFIED REGISTERED

## 2019-08-20 PROCEDURE — 77030013992 HC SNR POLYP ENDOSC BSC -B: Performed by: INTERNAL MEDICINE

## 2019-08-20 PROCEDURE — 88305 TISSUE EXAM BY PATHOLOGIST: CPT

## 2019-08-20 PROCEDURE — 77030039961 HC KT CUST COLON BSC -D: Performed by: INTERNAL MEDICINE

## 2019-08-20 RX ORDER — SODIUM CHLORIDE, SODIUM LACTATE, POTASSIUM CHLORIDE, CALCIUM CHLORIDE 600; 310; 30; 20 MG/100ML; MG/100ML; MG/100ML; MG/100ML
25 INJECTION, SOLUTION INTRAVENOUS CONTINUOUS
Status: DISCONTINUED | OUTPATIENT
Start: 2019-08-20 | End: 2019-08-20 | Stop reason: HOSPADM

## 2019-08-20 RX ORDER — LIDOCAINE HYDROCHLORIDE 10 MG/ML
0.1 INJECTION, SOLUTION EPIDURAL; INFILTRATION; INTRACAUDAL; PERINEURAL AS NEEDED
Status: DISCONTINUED | OUTPATIENT
Start: 2019-08-20 | End: 2019-08-20 | Stop reason: HOSPADM

## 2019-08-20 RX ORDER — EPINEPHRINE 0.1 MG/ML
1 INJECTION INTRACARDIAC; INTRAVENOUS
Status: CANCELLED | OUTPATIENT
Start: 2019-08-20 | End: 2019-08-21

## 2019-08-20 RX ORDER — SODIUM CHLORIDE 0.9 % (FLUSH) 0.9 %
5-40 SYRINGE (ML) INJECTION EVERY 8 HOURS
Status: DISCONTINUED | OUTPATIENT
Start: 2019-08-20 | End: 2019-08-20 | Stop reason: HOSPADM

## 2019-08-20 RX ORDER — ATROPINE SULFATE 0.1 MG/ML
0.5 INJECTION INTRAVENOUS
Status: CANCELLED | OUTPATIENT
Start: 2019-08-20 | End: 2019-08-21

## 2019-08-20 RX ORDER — DEXTROMETHORPHAN/PSEUDOEPHED 2.5-7.5/.8
1.2 DROPS ORAL
Status: CANCELLED | OUTPATIENT
Start: 2019-08-20

## 2019-08-20 RX ORDER — NALOXONE HYDROCHLORIDE 0.4 MG/ML
0.4 INJECTION, SOLUTION INTRAMUSCULAR; INTRAVENOUS; SUBCUTANEOUS
Status: CANCELLED | OUTPATIENT
Start: 2019-08-20 | End: 2019-08-20

## 2019-08-20 RX ORDER — FLUMAZENIL 0.1 MG/ML
0.2 INJECTION INTRAVENOUS
Status: CANCELLED | OUTPATIENT
Start: 2019-08-20 | End: 2019-08-20

## 2019-08-20 RX ORDER — SODIUM CHLORIDE 0.9 % (FLUSH) 0.9 %
5-40 SYRINGE (ML) INJECTION AS NEEDED
Status: CANCELLED | OUTPATIENT
Start: 2019-08-20

## 2019-08-20 RX ORDER — PROPOFOL 10 MG/ML
INJECTION, EMULSION INTRAVENOUS AS NEEDED
Status: DISCONTINUED | OUTPATIENT
Start: 2019-08-20 | End: 2019-08-20 | Stop reason: HOSPADM

## 2019-08-20 RX ORDER — SODIUM CHLORIDE 0.9 % (FLUSH) 0.9 %
5-40 SYRINGE (ML) INJECTION AS NEEDED
Status: DISCONTINUED | OUTPATIENT
Start: 2019-08-20 | End: 2019-08-20 | Stop reason: HOSPADM

## 2019-08-20 RX ORDER — SODIUM CHLORIDE 0.9 % (FLUSH) 0.9 %
5-40 SYRINGE (ML) INJECTION EVERY 8 HOURS
Status: CANCELLED | OUTPATIENT
Start: 2019-08-20

## 2019-08-20 RX ADMIN — PROPOFOL 20 MG: 10 INJECTION, EMULSION INTRAVENOUS at 08:39

## 2019-08-20 RX ADMIN — PROPOFOL 50 MG: 10 INJECTION, EMULSION INTRAVENOUS at 08:33

## 2019-08-20 RX ADMIN — PROPOFOL 20 MG: 10 INJECTION, EMULSION INTRAVENOUS at 08:49

## 2019-08-20 RX ADMIN — PROPOFOL 20 MG: 10 INJECTION, EMULSION INTRAVENOUS at 08:47

## 2019-08-20 RX ADMIN — PROPOFOL 20 MG: 10 INJECTION, EMULSION INTRAVENOUS at 08:40

## 2019-08-20 RX ADMIN — PROPOFOL 20 MG: 10 INJECTION, EMULSION INTRAVENOUS at 08:38

## 2019-08-20 RX ADMIN — PROPOFOL 20 MG: 10 INJECTION, EMULSION INTRAVENOUS at 08:37

## 2019-08-20 RX ADMIN — PROPOFOL 20 MG: 10 INJECTION, EMULSION INTRAVENOUS at 08:51

## 2019-08-20 RX ADMIN — PROPOFOL 20 MG: 10 INJECTION, EMULSION INTRAVENOUS at 08:41

## 2019-08-20 RX ADMIN — SODIUM CHLORIDE, SODIUM LACTATE, POTASSIUM CHLORIDE, AND CALCIUM CHLORIDE 25 ML/HR: 600; 310; 30; 20 INJECTION, SOLUTION INTRAVENOUS at 07:57

## 2019-08-20 RX ADMIN — PROPOFOL 20 MG: 10 INJECTION, EMULSION INTRAVENOUS at 08:34

## 2019-08-20 RX ADMIN — PROPOFOL 20 MG: 10 INJECTION, EMULSION INTRAVENOUS at 08:35

## 2019-08-20 RX ADMIN — PROPOFOL 20 MG: 10 INJECTION, EMULSION INTRAVENOUS at 08:36

## 2019-08-20 RX ADMIN — PROPOFOL 20 MG: 10 INJECTION, EMULSION INTRAVENOUS at 08:44

## 2019-08-20 RX ADMIN — PROPOFOL 20 MG: 10 INJECTION, EMULSION INTRAVENOUS at 08:45

## 2019-08-20 RX ADMIN — PROPOFOL 20 MG: 10 INJECTION, EMULSION INTRAVENOUS at 08:43

## 2019-08-20 RX ADMIN — PROPOFOL 20 MG: 10 INJECTION, EMULSION INTRAVENOUS at 08:52

## 2019-08-20 RX ADMIN — PROPOFOL 20 MG: 10 INJECTION, EMULSION INTRAVENOUS at 08:42

## 2019-08-20 RX ADMIN — PROPOFOL 50 MG: 10 INJECTION, EMULSION INTRAVENOUS at 08:32

## 2019-08-20 NOTE — DISCHARGE INSTRUCTIONS
Patient Discharge Instructions    Sameer Tillman / [id number] : 1955    Admitted 2019 Discharged: 2019         Procedure Impression:  1. Single small polyp removed  2. Medium sized internal hemorrhoids  3. Otherwise normal colon    Recommendation:  1. Resume regular diet, recommend high fiber  2. Will contact with polyp results in 2 weeks. These results will determine timing to next colon cancer screening. 3. Please contact our office if you have not received the results by three weeks. Recommended Diet: Regular Diet    Recommended Activity:    1. Do not drink alcohol, drive or operate machinery for 12 hours   2. Call if any fever, abdominal pain or bleeding noted. Signed By: Benitez Somers MD     2019           DISCHARGE SUMMARY from Nurse    PATIENT INSTRUCTIONS:    After general anesthesia or intravenous sedation, for 24 hours or while taking prescription Narcotics:  · Limit your activities  · Do not drive and operate hazardous machinery  · Do not make important personal or business decisions  · Do  not drink alcoholic beverages  · If you have not urinated within 8 hours after discharge, please contact your surgeon on call. Report the following to your surgeon:  · Excessive pain, swelling, redness or odor of or around the surgical area  · Temperature over 100.5  · Nausea and vomiting lasting longer than 4 hours or if unable to take medications  · Any signs of decreased circulation or nerve impairment to extremity: change in color, persistent  numbness, tingling, coldness or increase pain  · Any questions    These are general instructions for a healthy lifestyle:    No smoking/ No tobacco products/ Avoid exposure to second hand smoke  Surgeon General's Warning:  Quitting smoking now greatly reduces serious risk to your health.     Obesity, smoking, and sedentary lifestyle greatly increases your risk for illness    A healthy diet, regular physical exercise & weight monitoring are important for maintaining a healthy lifestyle    You may be retaining fluid if you have a history of heart failure or if you experience any of the following symptoms:  Weight gain of 3 pounds or more overnight or 5 pounds in a week, increased swelling in our hands or feet or shortness of breath while lying flat in bed. Please call your doctor as soon as you notice any of these symptoms; do not wait until your next office visit. Patient armband removed and given to patient to take home. Patient was informed of the privacy risks if armband lost or stolen      The discharge information has been reviewed with the patient. The patient verbalized understanding. Discharge medications reviewed with the patient and appropriate educational materials and side effects teaching were provided.   ___________________________________________________________________________________________________________________________________

## 2019-08-20 NOTE — H&P
History and Physical    [facility]  1955  [id number]  [id number]    Pre-Procedure Diagnosis:  [facility] z80.0      Evaluation of past illnesses, surgeries, or injuries:   YES  Past Medical History:   Diagnosis Date    Frozen shoulder     right side    GERD (gastroesophageal reflux disease)     Hypercholesterolemia     Hypertension     Hypothyroid     Neuropathy      Past Surgical History:   Procedure Laterality Date    HX CHOLECYSTECTOMY  02/15/2018    Robotic single-site cholecystectomy    HX OOPHORECTOMY         Allergies:  No Known Allergies    Previous reactions to sedation/analgesia? NO    Review of current medications, supplement, herbals and nutraceuticals complete:  YES  Current Facility-Administered Medications   Medication Dose Route Frequency Provider Last Rate Last Dose    lidocaine (PF) (XYLOCAINE) 10 mg/mL (1 %) injection 0.1 mL  0.1 mL SubCUTAneous PRN Sowmya Florez CRNA        lactated Ringers infusion  25 mL/hr IntraVENous CONTINUOUS Sowmya Florez CRNA 25 mL/hr at 19 0757 25 mL/hr at 19 0757    sodium chloride (NS) flush 5-40 mL  5-40 mL IntraVENous Q8H Sowmya Florez CRNA        sodium chloride (NS) flush 5-40 mL  5-40 mL IntraVENous PRN Catha Sowmya Peña CRNA              Pertinent labs reviewed? YES    History of substance abuse? NO  History reviewed. No pertinent family history.   Social History     Socioeconomic History    Marital status:      Spouse name: Not on file    Number of children: Not on file    Years of education: Not on file    Highest education level: Not on file   Occupational History    Not on file   Social Needs    Financial resource strain: Not on file    Food insecurity:     Worry: Not on file     Inability: Not on file    Transportation needs:     Medical: Not on file     Non-medical: Not on file   Tobacco Use    Smoking status: Former Smoker     Last attempt to quit: 1981     Years since quittin.0    Smokeless tobacco: Never Used   Substance and Sexual Activity    Alcohol use: Yes     Comment: occas    Drug use: No    Sexual activity: Not on file   Lifestyle    Physical activity:     Days per week: Not on file     Minutes per session: Not on file    Stress: Not on file   Relationships    Social connections:     Talks on phone: Not on file     Gets together: Not on file     Attends Anabaptist service: Not on file     Active member of club or organization: Not on file     Attends meetings of clubs or organizations: Not on file     Relationship status: Not on file    Intimate partner violence:     Fear of current or ex partner: Not on file     Emotionally abused: Not on file     Physically abused: Not on file     Forced sexual activity: Not on file   Other Topics Concern    Not on file   Social History Narrative    Not on file       Cardiac Status:  WNL    Mental Status:  WNL     Pulmonary Status:  WNL    NPO:  >4    Assessment/Impression: Family history of colon cancer    Plan of treatment: Colonoscopy        Gavin Kim MD  8/20/2019  8:24 AM

## 2019-08-20 NOTE — PROCEDURES
Colonoscopy Report    Patient: Sameer Tillman MRN: [id number]  SSN: [id number]    YOB: 1955  Age: 62 y.o. Sex: female      Date of Procedure: 8/20/2019    IMPRESSION:  1. Single small polyp removed  2. Medium sized internal hemorrhoids  3. Otherwise normal colon    RECOMMENDATIONS:  1. Resume regular diet, Recommend high fiber. 2. Will contact with polyp results in 2 weeks. These results will determine timing to next screening. Patient will be instructed to contact our office if they have not received the results by three weeks. Indication:  Family history of coloretal cancer (screening only)  Procedure Performed: Colonoscopy polypectomy (cold snare)  Endoscopist: Benitez Somers MD  Assistant: Endoscopy Technician-1: Love Worley  Endoscopy RN-1: Ally Jennings RN  ASA: Per Anesthesia  Mallampati Score: Per anesthesia report  Anesthesia: MAC anesthesia  Endoscope:     [x]  CF H 190AL   []  PCF H190AL   []  GIF H 190    Extent of Examination:terminal ileum  Quality of Preparation:     [x]  Excellent   []  Very Good   [] Fair but adequate   [] Fair, inadequate   []  Poor      Technique: The procedure was discussed with the patient including risks, benefits, alternatives including risks of IV sedation, bleeding, perforation and missed polyp. A safety timeout was performed. The patient was given incremental doses of intravenous sedation to achieve moderate sedation. The patients vital signs were monitored at all times including heart rate, rhythm, blood pressure and oxygen saturation. The patient was placed in left lateral position. When adequate sedation was achieved a perianal inspection and a digital rectal exam were performed. Video colonoscope was introduced into the rectum and advanced under direct vision up to the terminal ileum. The cecum was identified by IC valve, appendiceal orifice and crows foot.  With adequate insufflation and maneuvering of the withdrawing scope, the colonic mucosa was visualized carefully. Retroflexion was performed in the rectum and the distal rectum visualized. The patient tolerated the procedure very well and was transferred to recovery area. Findings:  1. Normal rectal examination with medium sized internal hemorrhoids in the anorectum  2. A single 4mm sessile ascending colon polyp was removed with cold snare polypectomy  3. The colonic mucosa was otherwise normal without inflammatory changes, mass, or vascular abnormality  4.  The terminal ileum was normal to 10cm of evaluation       EBL:Minimal  Specimen:   ID Type Source Tests Collected by Time Destination   1 : (Cold Snare) Polyp Preservative Colon, Ascending  Catheline MD Facundo 8/20/2019 6171 Pathology       Bhumi Mast MD  August 20, 2019  9:01 AM

## 2019-08-20 NOTE — ANESTHESIA POSTPROCEDURE EVALUATION
Procedure(s):  COLONOSCOPY.     MAC    Anesthesia Post Evaluation      Multimodal analgesia: multimodal analgesia not used between 6 hours prior to anesthesia start to PACU discharge  Patient location during evaluation: bedside  Patient participation: complete - patient participated  Level of consciousness: awake  Pain score: 0  Pain management: adequate  Airway patency: patent  Anesthetic complications: no  Cardiovascular status: acceptable  Respiratory status: acceptable  Hydration status: acceptable  Post anesthesia nausea and vomiting:  none      Vitals Value Taken Time   /70 8/20/2019  9:03 AM   Temp 36.1 °C (97 °F) 8/20/2019  9:00 AM   Pulse 70 8/20/2019  9:03 AM   Resp 16 8/20/2019  9:03 AM   SpO2 99 % 8/20/2019  9:03 AM

## 2019-08-20 NOTE — ANESTHESIA PREPROCEDURE EVALUATION
Relevant Problems   No relevant active problems       Anesthetic History   No history of anesthetic complications            Review of Systems / Medical History  Patient summary reviewed, nursing notes reviewed and pertinent labs reviewed    Pulmonary  Within defined limits                 Neuro/Psych       CVA: no residual symptoms  TIA     Cardiovascular    Hypertension: well controlled                   GI/Hepatic/Renal     GERD: well controlled           Endo/Other      Hypothyroidism: well controlled       Other Findings              Physical Exam    Airway  Mallampati: III  TM Distance: 4 - 6 cm  Neck ROM: normal range of motion   Mouth opening: Normal     Cardiovascular  Regular rate and rhythm,  S1 and S2 normal,  no murmur, click, rub, or gallop             Dental    Dentition: Poor dentition     Pulmonary  Breath sounds clear to auscultation               Abdominal  GI exam deferred       Other Findings            Anesthetic Plan    ASA: 3  Anesthesia type: MAC          Induction: Intravenous  Anesthetic plan and risks discussed with: Patient

## 2019-08-20 NOTE — PERIOP NOTES
Phase 2 Recovery Summary  Patient arrived to Phase 2 at 0902  Report received from [address]:    08/20/19 0737 08/20/19 0752 08/20/19 0900 08/20/19 0903   BP: 183/88  136/72 137/70   Pulse: 78  76 70   Resp:  18 16 16   Temp: 97 °F (36.1 °C)  97 °F (36.1 °C)    SpO2: 94%  99% 99%   Weight: 82.3 kg (181 lb 8 oz)      Height: 5' 6\" (1.676 m)          oriented to time, place, person and situation    Lines and Drains  Peripheral Intravenous Line:   Peripheral IV 08/20/19 Right Antecubital (Active)   Site Assessment Clean, dry, & intact 8/20/2019  9:07 AM   Phlebitis Assessment 0 8/20/2019  9:07 AM   Infiltration Assessment 0 8/20/2019  9:07 AM   Dressing Status Clean, dry, & intact 8/20/2019  9:07 AM   Dressing Type Tape;Transparent 8/20/2019  9:07 AM   Hub Color/Line Status Pink 8/20/2019  9:07 AM     A&Ox 4. Family brought back to recovery room. Patient tolerated PO fluids. Patient ambulated from bed to chair with minimal assistance. IV removed and patient allowed to get dressed. Reviewed d.c instructions with patient and .  signed for d/c. Patient transported to vehicle via wheelchair.        Patient discharged to home with        Brit Leo RN

## 2019-12-19 ENCOUNTER — HOSPITAL ENCOUNTER (OUTPATIENT)
Dept: GENERAL RADIOLOGY | Age: 64
Discharge: HOME | End: 2019-12-19
Attending: INTERNAL MEDICINE
Payer: COMMERCIAL

## 2019-12-19 DIAGNOSIS — D47.2 MONOCLONAL PARAPROTEINEMIA: ICD-10-CM

## 2019-12-19 PROCEDURE — 77075 RADEX OSSEOUS SURVEY COMPL: CPT

## 2020-01-21 ENCOUNTER — HOSPITAL ENCOUNTER (OUTPATIENT)
Dept: CT IMAGING | Age: 65
Discharge: HOME | End: 2020-01-21
Attending: INTERNAL MEDICINE
Payer: COMMERCIAL

## 2020-01-21 ENCOUNTER — HOSPITAL ENCOUNTER (OUTPATIENT)
Dept: LAB | Age: 65
Discharge: HOME | End: 2020-01-21
Attending: INTERNAL MEDICINE
Payer: COMMERCIAL

## 2020-01-21 DIAGNOSIS — R05.9 COUGH: ICD-10-CM

## 2020-01-21 LAB
BASOPHILS # BLD: 0 K/UL (ref 0–0.1)
BASOPHILS NFR BLD: 0 % (ref 0–2)
DIFFERENTIAL METHOD BLD: NORMAL
EOSINOPHIL # BLD: 0.1 K/UL (ref 0–0.4)
EOSINOPHIL NFR BLD: 2 % (ref 0–5)
ERYTHROCYTE [DISTWIDTH] IN BLOOD BY AUTOMATED COUNT: 12.1 % (ref 11.6–14.5)
HCT VFR BLD AUTO: 41.3 % (ref 35–45)
HGB BLD-MCNC: 13.5 G/DL (ref 12–16)
LYMPHOCYTES # BLD: 1.8 K/UL (ref 0.9–3.6)
LYMPHOCYTES NFR BLD: 34 % (ref 21–52)
MCH RBC QN AUTO: 31 PG (ref 24–34)
MCHC RBC AUTO-ENTMCNC: 32.7 G/DL (ref 31–37)
MCV RBC AUTO: 94.9 FL (ref 74–97)
MONOCYTES # BLD: 0.3 K/UL (ref 0.05–1.2)
MONOCYTES NFR BLD: 6 % (ref 3–10)
NEUTS SEG # BLD: 3 K/UL (ref 1.8–8)
NEUTS SEG NFR BLD: 58 % (ref 40–73)
PLATELET # BLD AUTO: 275 K/UL (ref 135–420)
PMV BLD AUTO: 11 FL (ref 9.2–11.8)
RBC # BLD AUTO: 4.35 M/UL (ref 4.2–5.3)
WBC # BLD AUTO: 5.1 K/UL (ref 4.6–13.2)

## 2020-01-21 PROCEDURE — 36415 COLL VENOUS BLD VENIPUNCTURE: CPT

## 2020-01-21 PROCEDURE — 86003 ALLG SPEC IGE CRUDE XTRC EA: CPT

## 2020-01-21 PROCEDURE — 82785 ASSAY OF IGE: CPT

## 2020-01-21 PROCEDURE — 85025 COMPLETE CBC W/AUTO DIFF WBC: CPT

## 2020-01-21 PROCEDURE — 82784 ASSAY IGA/IGD/IGG/IGM EACH: CPT

## 2020-01-21 PROCEDURE — 86360 T CELL ABSOLUTE COUNT/RATIO: CPT

## 2020-01-21 PROCEDURE — 86162 COMPLEMENT TOTAL (CH50): CPT

## 2020-01-21 PROCEDURE — 82787 IGG 1 2 3 OR 4 EACH: CPT

## 2020-01-21 PROCEDURE — 71250 CT THORAX DX C-: CPT

## 2020-01-22 LAB
BASOPHILS # BLD AUTO: 0 X10E3/UL (ref 0–0.2)
BASOPHILS NFR BLD AUTO: 0 %
CD3+CD4+ CELLS # BLD: 1150 /UL (ref 359–1519)
CD3+CD4+ CELLS NFR BLD: 63.9 % (ref 30.8–58.5)
CD3+CD4+ CELLS/CD3+CD8+ CLL BLD: 3.36 % (ref 0.92–3.72)
CD3+CD8+ CELLS # BLD: 342 /UL (ref 109–897)
CD3+CD8+ CELLS NFR BLD: 19 % (ref 12–35.5)
CH50 SERPL-ACNC: >60 U/ML (ref 42–999999)
EOSINOPHIL # BLD AUTO: 0.1 X10E3/UL (ref 0–0.4)
EOSINOPHIL NFR BLD AUTO: 2 %
ERYTHROCYTE [DISTWIDTH] IN BLOOD BY AUTOMATED COUNT: 12.3 % (ref 11.7–15.4)
HCT VFR BLD AUTO: 39.5 % (ref 34–46.6)
HGB BLD-MCNC: 13.5 G/DL (ref 11.1–15.9)
IGG SERPL-MCNC: 1139 MG/DL (ref 700–1600)
IGG1 SER-MCNC: 545 MG/DL (ref 248–810)
IGG2 SER-MCNC: 385 MG/DL (ref 130–555)
IGG3 SER-MCNC: 79 MG/DL (ref 15–102)
IGG4 SER-MCNC: 15 MG/DL (ref 2–96)
IMM GRANULOCYTES # BLD: 0 X10E3/UL (ref 0–0.1)
IMM GRANULOCYTES NFR BLD: 0 %
LYMPHOCYTES # BLD AUTO: 1.8 X10E3/UL (ref 0.7–3.1)
LYMPHOCYTES NFR BLD AUTO: 34 %
MCH RBC QN AUTO: 31.5 PG (ref 26.6–33)
MCHC RBC AUTO-ENTMCNC: 34.2 G/DL (ref 31.5–35.7)
MCV RBC AUTO: 92 FL (ref 79–97)
MONOCYTES # BLD AUTO: 0.4 X10E3/UL (ref 0.1–0.9)
MONOCYTES NFR BLD AUTO: 7 %
NEUTROPHILS # BLD AUTO: 3 X10E3/UL (ref 1.4–7)
NEUTROPHILS NFR BLD AUTO: 57 %
PLATELET # BLD AUTO: 287 X10E3/UL (ref 150–450)
RBC # BLD AUTO: 4.28 X10E6/UL (ref 3.77–5.28)
WBC # BLD AUTO: 5.3 X10E3/UL (ref 3.4–10.8)

## 2020-01-23 LAB
A ALTERNATA IGE QN: <0.1 KU/L
A FUMIGATUS IGE QN: <0.1 KU/L
AMER ROACH IGE QN: <0.1 KU/L
AMER SYCAMORE IGE QN: <0.1 KU/L
BAHIA GRASS IGE QN: <0.1 KU/L
BERMUDA GRASS IGE QN: <0.1 KU/L
BOXELDER IGE QN: <0.1 KU/L
C HERBARUM IGE QN: <0.1 KU/L
CAT DANDER IGG QN: <0.1 KU/L
CLASS DESCRIPTION, 600268: NORMAL
COMMON RAGWEED IGE QN: <0.1 KU/L
D FARINAE IGE QN: <0.1 KU/L
D PTERONYSS IGE QN: <0.1 KU/L
DEPRECATED IGE QN: <0.1 KU/L
DOG DANDER IGE QN: <0.1 KU/L
ENGL PLANTAIN IGE QN: <0.1 KU/L
IGE SERPL-ACNC: 13 IU/ML (ref 6–495)
JOHNSON GRASS IGE QN: <0.1 KU/L
M RACEMOSUS IGE QN: <0.1 KU/L
MT JUNIPER IGE QN: <0.1 KU/L
MUGWORT IGE QN: <0.1 KU/L
NETTLE IGE QN: <0.1 KU/L
P NOTATUM IGE QN: <0.1 KU/L
S BOTRYOSUM IGE QN: <0.1 KU/L
SHEEP SORREL IGE QN: <0.1 KU/L
SWEET GUM IGE QN: <0.1 KU/L
TIMOTHY IGE QN: <0.1 KU/L
WHITE BIRCH IGE QN: <0.1 KU/L
WHITE ELM IGG QN: <0.1 KU/L
WHITE HICKORY IGE QN: <0.1 KU/L
WHITE MULBERRY IGE QN: <0.1 KU/L
WHITE OAK IGE QN: <0.1 KU/L

## 2020-01-24 LAB
IGA SERPL-MCNC: 366 MG/DL (ref 87–352)
IGG SERPL-MCNC: 1159 MG/DL (ref 700–1600)
IGM SERPL-MCNC: 69 MG/DL (ref 26–217)
PROT PATTERN SERPL IFE-IMP: ABNORMAL

## 2022-04-13 ENCOUNTER — OFFICE VISIT (OUTPATIENT)
Dept: CARDIOLOGY CLINIC | Age: 67
End: 2022-04-13
Payer: COMMERCIAL

## 2022-04-13 VITALS
RESPIRATION RATE: 14 BRPM | OXYGEN SATURATION: 98 % | BODY MASS INDEX: 29.21 KG/M2 | WEIGHT: 181 LBS | TEMPERATURE: 98.9 F | HEART RATE: 86 BPM | DIASTOLIC BLOOD PRESSURE: 65 MMHG | SYSTOLIC BLOOD PRESSURE: 194 MMHG

## 2022-04-13 DIAGNOSIS — I10 PRIMARY HYPERTENSION: Primary | ICD-10-CM

## 2022-04-13 PROCEDURE — 93000 ELECTROCARDIOGRAM COMPLETE: CPT | Performed by: INTERNAL MEDICINE

## 2022-04-13 PROCEDURE — 99204 OFFICE O/P NEW MOD 45 MIN: CPT | Performed by: INTERNAL MEDICINE

## 2022-04-13 RX ORDER — LISINOPRIL 20 MG/1
TABLET ORAL
COMMUNITY
Start: 2022-03-04 | End: 2022-04-13 | Stop reason: ALTCHOICE

## 2022-04-13 RX ORDER — OLMESARTAN MEDOXOMIL 40 MG/1
40 TABLET ORAL DAILY
COMMUNITY
End: 2022-04-15 | Stop reason: DRUGHIGH

## 2022-04-13 NOTE — PROGRESS NOTES
Identified pt with two pt identifiers(name and ). Reviewed record in preparation for visit and have obtained necessary documentation. Bishop Shelby presents today for   Chief Complaint   Patient presents with    New Patient       Pt c/o DIZZINESS, CHEST PAIN/ PRESSURE,  HEADACHES, SWELLING. Bishop Shelby preferred language for health care discussion is english/other. Personal Protective Equipment:   Personal Protective Equipment was used including: mask-surgical and hands-gloves. Patient was placed on no precaution(s). Patient was masked. Precautions:   Patient currently on None  Patient currently roomed with door closed. Is someone accompanying this pt? no    Is the patient using any DME equipment during OV? no    Depression Screening:  No flowsheet data found. Learning Assessment:  Learning Assessment 2016   PRIMARY LEARNER Patient   PRIMARY LANGUAGE ENGLISH   LEARNER PREFERENCE PRIMARY READING   ANSWERED BY Patient   RELATIONSHIP SELF       Abuse Screening:  No flowsheet data found. Fall Risk  No flowsheet data found. Pt currently taking Anticoagulant therapy? no  Pt currently taking Antiplatelet therapy? no    Coordination of Care:  1. Have you been to the ER, urgent care clinic since your last visit? Hospitalized since your last visit? no    2. Have you seen or consulted any other health care providers outside of the [address] [place] [address] since your last visit? Include any pap smears or colon screening. yes      Please see Red banners under Allergies and Med Rec to remove outside inquires. All correct information has been verified with patient and added to chart.      Medication's patient's would liked removed has been marked not taking to be removed per Verbal order and read back per Aaliyah Ram MD

## 2022-04-13 NOTE — PATIENT INSTRUCTIONS
New Medication/Medication Changes  Stop lisinopril   Start benicar 20 mg daily       **please allow 24-48 hrs for medication to be escribed to pharmacy** If you need any refills on medications please contact your pharmacy so that the request can be escribed to the provider for review.     Blood pressure check in 1 week

## 2022-04-15 ENCOUNTER — TELEPHONE (OUTPATIENT)
Dept: CARDIOLOGY CLINIC | Age: 67
End: 2022-04-15

## 2022-04-15 RX ORDER — OLMESARTAN MEDOXOMIL 20 MG/1
20 TABLET ORAL DAILY
COMMUNITY
End: 2022-04-18 | Stop reason: SDUPTHER

## 2022-04-15 RX ORDER — OLMESARTAN MEDOXOMIL 20 MG/1
20 TABLET ORAL DAILY
Qty: 90 TABLET | Refills: 3 | Status: CANCELLED | OUTPATIENT
Start: 2022-04-15

## 2022-04-15 NOTE — TELEPHONE ENCOUNTER
PCP: Guillermina Ruiz    Last appt: 4/13/2022  Future Appointments   Date Time Provider Torsten Shane   4/25/2022 10:00 AM CSI, BP/EKG NORF [facility] AMB   5/13/2022  3:30 PM Laverne Sawyre MD [facility] AMB       Requested Prescriptions     Pending Prescriptions Disp Refills    olmesartan (Benicar) 20 mg tablet 90 Tablet 2     Sig: Take 1 Tablet by mouth daily.

## 2022-04-15 NOTE — TELEPHONE ENCOUNTER
Patient was prescribed benicar at her appt on Wednesday and it says it has not been called into the pharmacy yet

## 2022-04-18 NOTE — TELEPHONE ENCOUNTER
PCP: Guillermina Clifton    Last appt: 4/13/2022  Future Appointments   Date Time Provider Torsten Shane   4/25/2022 10:00 AM CSI, BP/EKG NORITALO [facility] BS AMB   5/13/2022  3:30 PM Micaela Rowley MD [facility] AMB       Requested Prescriptions     Pending Prescriptions Disp Refills    olmesartan (Benicar) 20 mg tablet 90 Tablet 3     Sig: Take 1 Tablet by mouth daily.

## 2022-04-20 RX ORDER — OLMESARTAN MEDOXOMIL 20 MG/1
20 TABLET ORAL DAILY
Qty: 90 TABLET | Refills: 3 | Status: SHIPPED | OUTPATIENT
Start: 2022-04-20 | End: 2022-05-13 | Stop reason: SDUPTHER

## 2022-04-25 ENCOUNTER — CLINICAL SUPPORT (OUTPATIENT)
Dept: CARDIOLOGY CLINIC | Age: 67
End: 2022-04-25

## 2022-04-25 VITALS — HEART RATE: 73 BPM | SYSTOLIC BLOOD PRESSURE: 130 MMHG | DIASTOLIC BLOOD PRESSURE: 88 MMHG

## 2022-04-25 DIAGNOSIS — Z01.30 BLOOD PRESSURE CHECK: Primary | ICD-10-CM

## 2022-04-25 NOTE — PROGRESS NOTES
Tod Bautista was seen in our office today for BP evaluation. Listed below are the patients current meds:      Current Outpatient Medications:     olmesartan (Benicar) 20 mg tablet, Take 1 Tablet by mouth daily. , Disp: 90 Tablet, Rfl: 3    aspirin delayed-release 81 mg tablet, Take 81 mg by mouth daily. , Disp: , Rfl:     metoprolol succinate (TOPROL-XL) 25 mg XL tablet, Take 25 mg by mouth daily. , Disp: , Rfl:     omega-3 fatty acids-vitamin e 1,000 mg cap, Take 3 Caps by mouth daily. , Disp: , Rfl:     amitriptyline (ELAVIL) 10 mg tablet, Take 10 mg by mouth nightly., Disp: , Rfl:     gabapentin (NEURONTIN) 300 mg capsule, Take 300 mg by mouth three (3) times daily. , Disp: , Rfl:     levothyroxine (SYNTHROID) 25 mcg tablet, Take 25 mcg by mouth Daily (before breakfast). , Disp: , Rfl:     atorvastatin (LIPITOR) 20 mg tablet, Take 20 mg by mouth daily. , Disp: , Rfl:     glucosamine-chondroitin (ARTHX) 500-400 mg cap, Take 2 Caps by mouth daily. , Disp: , Rfl:     PT stated she has taken meds 2 hours prior to appt. PT was late starting medication. No current facility-administered medications for this visit. Visit Vitals  /88   Pulse 73       Plan:     Patient to continue current medications. Advised patient that I would forward to Dr. Joseph Shi for review and further instructions. Advised patient that we would call within 24-48 hours with any changes. Patient verbalized understanding.

## 2022-04-25 NOTE — PROGRESS NOTES
Subjective:      John Mendez is in the office today for cardiac reevaluation. She is a 61-year-old woman that has been troubled by palpitations in the past.  She was seen in this office at her last visit of 9/11/2017. She has been on various betablockers and remains on  metoprolol succinate at 25 mg per day. She had  excessive slowing of heart rate on betablockers in the past.  She was also on Cardizem at one point, which she did not tolerate. In the office today, she reports that her blood pressure has been very elevated for the last couple months. She also complains of chest pain and had a CT angiogram of the heart which was done on 11/07/2016. Her coronaries were normal at that time. Because of the persistence of her symptoms, she had an electrophysiological office evaluation done by Dr. Trey Orlando on 02/01/2017. Dr. Trey Orlando mentioned that when the patient was complaining of palpitations, he was taking her pulse and there was no irregularity, which was confirmed by electrocardiography. No further suggestions in her care were made at that time. In the office today, she says that she is in need of a dental extraction. Because her blood pressure was quite elevated, they could not perform the procedure. She does report increasing palpitations. She reports also that over the last couple months her blood pressure has been 200/100 at times.                    Patient Active Problem List    Diagnosis Date Noted    Numbness 04/25/2016    Normal coronary arteries 12/21/2016    Palpitations 05/17/2016    Dyslipidemia 04/13/2016    Dizziness 04/13/2016    Precordial pain 04/13/2016    TIA (transient ischemic attack) 03/13/2016    Hypertension 03/13/2016    Hypothyroidism 03/13/2016    Hyperglycemia 03/13/2016    UTI (urinary tract infection) 03/13/2016     Current Outpatient Medications   Medication Sig Dispense Refill    amitriptyline (ELAVIL) 10 mg tablet Take 10 mg by mouth nightly.  aspirin delayed-release 81 mg tablet Take 81 mg by mouth daily.  gabapentin (NEURONTIN) 300 mg capsule Take 300 mg by mouth three (3) times daily.  metoprolol succinate (TOPROL-XL) 25 mg XL tablet Take 25 mg by mouth daily.  levothyroxine (SYNTHROID) 25 mcg tablet Take 25 mcg by mouth Daily (before breakfast).  atorvastatin (LIPITOR) 20 mg tablet Take 20 mg by mouth daily.  omega-3 fatty acids-vitamin e 1,000 mg cap Take 3 Caps by mouth daily.  glucosamine-chondroitin (ARTHX) 500-400 mg cap Take 2 Caps by mouth daily.  olmesartan (Benicar) 20 mg tablet Take 1 Tablet by mouth daily. 90 Tablet 3     No Known Allergies  Past Medical History:   Diagnosis Date    Frozen shoulder     right side    GERD (gastroesophageal reflux disease)     Hypercholesterolemia     Hypertension     Hypothyroid     Neuropathy      Past Surgical History:   Procedure Laterality Date    COLONOSCOPY N/A 2019    COL[facility]COPY performed by Jose [facility] at [facility] ENDOSCOPY    HX [address] [place] [address]  02/15/2018    Robotic single-site cholecystectomy    HX OOPHORECTOMY       No family history on file.   Social History     Tobacco Use   Smoking Status Former Smoker    Quit date: 1981    Years since quittin.7   Smokeless Tobacco Never Used          Review of Systems, additional:  Constitutional: negative  Eyes: negative  Respiratory: negative  Cardiovascular: positive for palpitations  Gastrointestinal: negative  Musculoskeletal:negative  Neurological: negative  Behvioral/Psych: negative  Endocrine: negative  ENT: negative    Objective:     Visit Vitals  BP (!) 194/65   Pulse 86   Temp 98.9 °F (37.2 °C) (Temporal)   Resp 14   Wt 82.1 kg (181 lb)   SpO2 98%   BMI 29.21 kg/m²     General:  alert, cooperative, no distress   Chest Wall: inspection normal - no chest wall deformities or tenderness, respiratory effort normal   Lung: clear to auscultation bilaterally   Heart:  normal rate, regular rhythm, normal S1, S2, no murmurs, rubs, clicks or gallops   Abdomen: soft, non-tender. Bowel sounds normal. No masses,  no organomegaly   Extremities: extremities normal, atraumatic, no cyanosis or edema Skin: no rashes   Neuro: alert, oriented, normal speech, no focal findings or movement disorder noted         Assessment/Plan:       ICD-10-CM ICD-9-CM    1. . Essential hypertension, BP not well-controlled at present, will discontinue lisinopril and start Benicar 20 mg daily. Blood pressure check in 1 week. Return to the office in 3 weeks. I10 401.9    2. Transient cerebral ischemia, unspecified type G45.9 435.9    3. Dyslipidemia E78.5 272.4    4. Dizziness R42 780.4    5. Precordial pain, atypical for cardiac origin with normal coronaries by Cardiac CTA in 11/2016 R07.2 786.51    6. Palpitations,  evaluated by EP. Will continue metoprolol. Last 48 hour holter on 3/3/2017; Minimal ectopy with no repetitive ventricular ectopics. RT 6 mos R00.2 785.1    7.  Normal coronary arteries, by cardiac CTA done on 11/7/2016 Z03.89 V71.7

## 2022-05-13 ENCOUNTER — OFFICE VISIT (OUTPATIENT)
Dept: CARDIOLOGY CLINIC | Age: 67
End: 2022-05-13
Payer: COMMERCIAL

## 2022-05-13 VITALS
OXYGEN SATURATION: 98 % | TEMPERATURE: 98.3 F | HEART RATE: 71 BPM | DIASTOLIC BLOOD PRESSURE: 100 MMHG | BODY MASS INDEX: 28.73 KG/M2 | WEIGHT: 178 LBS | SYSTOLIC BLOOD PRESSURE: 214 MMHG

## 2022-05-13 DIAGNOSIS — R07.9 CHEST PAIN, UNSPECIFIED TYPE: Primary | ICD-10-CM

## 2022-05-13 PROCEDURE — 99214 OFFICE O/P EST MOD 30 MIN: CPT | Performed by: INTERNAL MEDICINE

## 2022-05-13 RX ORDER — BUDESONIDE 0.25 MG/2ML
INHALANT ORAL
COMMUNITY

## 2022-05-13 RX ORDER — ALBUTEROL SULFATE 0.63 MG/3ML
0.63 SOLUTION RESPIRATORY (INHALATION)
COMMUNITY

## 2022-05-13 RX ORDER — MONTELUKAST SODIUM 10 MG/1
10 TABLET ORAL DAILY
COMMUNITY

## 2022-05-13 NOTE — TELEPHONE ENCOUNTER
PCP: Guillermina Sims    Last appt: 4/25/2022  Future Appointments   Date Time Provider Torsten Shane   5/13/2022  3:30 PM Burak Potter MD [facility] BS AMB       Requested Prescriptions     Pending Prescriptions Disp Refills    olmesartan (Benicar) 40 mg tablet 90 Tablet 3     Sig: Take 1 Tablet by mouth daily.

## 2022-05-13 NOTE — PROGRESS NOTES
Identified pt with two pt identifiers(name and ). Reviewed record in preparation for visit and have obtained necessary documentation. Luz Maria Ortiz presents today for   Chief Complaint   Patient presents with    Follow-up     3w       Pt c/o  CHEST PAIN/ PRESSURE and SWELLING. Luz Maria Ortiz preferred language for health care discussion is english/other. Personal Protective Equipment:   Personal Protective Equipment was used including: mask-surgical and hands-gloves. Patient was placed on no precaution(s). Patient was masked. Precautions:   Patient currently on None  Patient currently roomed with door closed. Is someone accompanying this pt? no    Is the patient using any DME equipment during OV? no    Depression Screening:  No flowsheet data found. Learning Assessment:  Learning Assessment 2016   PRIMARY LEARNER Patient   PRIMARY LANGUAGE ENGLISH   LEARNER PREFERENCE PRIMARY READING   ANSWERED BY Patient   RELATIONSHIP SELF       Abuse Screening:  No flowsheet data found. Fall Risk  No flowsheet data found. Pt currently taking Anticoagulant therapy? no  Pt currently taking Antiplatelet therapy? no    Coordination of Care:  1. Have you been to the ER, urgent care clinic since your last visit? Hospitalized since your last visit? no    2. Have you seen or consulted any other health care providers outside of the [address] [place] [address] since your last visit? Include any pap smears or colon screening. yes      Please see Red banners under Allergies and Med Rec to remove outside inquires. All correct information has been verified with patient and added to chart.      Medication's patient's would liked removed has been marked not taking to be removed per Verbal order and read back per Trae Jones MD

## 2022-05-14 RX ORDER — OLMESARTAN MEDOXOMIL 40 MG/1
40 TABLET ORAL DAILY
Qty: 90 TABLET | Refills: 3 | Status: SHIPPED | OUTPATIENT
Start: 2022-05-14 | End: 2022-06-29

## 2022-05-20 ENCOUNTER — CLINICAL SUPPORT (OUTPATIENT)
Dept: CARDIOLOGY CLINIC | Age: 67
End: 2022-05-20

## 2022-05-20 VITALS — HEART RATE: 65 BPM | DIASTOLIC BLOOD PRESSURE: 73 MMHG | SYSTOLIC BLOOD PRESSURE: 175 MMHG

## 2022-05-20 DIAGNOSIS — Z01.30 BLOOD PRESSURE CHECK: Primary | ICD-10-CM

## 2022-05-22 NOTE — PROGRESS NOTES
Subjective:      Earle Hernandez is in the office today for cardiac reevaluation. She is a 81-year-old woman that has been troubled by palpitations in the past. She has been on various betablockers and remains on metoprolol succinate at 25 mg per day. She had  excessive slowing of heart rate on betablockers in the past.  She was also on Cardizem at one point, which she did not tolerate. In the office today, she reports that her blood pressure has been very elevated for the last couple of months. She also complains of chest pain. She had a  CTA of the heart done on 11/07/2016. Her coronaries were normal at that time. Because of the persistence of her symptoms, she had an electrophysiological office evaluation done by Dr. Dominic Peterson on 02/01/2017. Dr. Dominic Peterson mentioned that when the patient was complaining of palpitations, he was taking her pulse and there was no irregularity, which was confirmed by electrocardiography. No further suggestions in her care were made at that time. In the office today, she says that she finally had her dental extraction. Because her blood pressure was quite elevated, they could not perform the procedure. She does report increasing palpitations. She reports also that over the last couple months her blood pressure has been 200/100 at times. In the office today,  she reports persistent chest pain and left arm pain. She localizes the discomfort to the upper left precordium. It is random. It has occurred at rest.  It generally last about 10 to 15 minutes. She is treated never tried anything in particular for relief. She is also still having palpitations.                  Patient Active Problem List    Diagnosis Date Noted    Numbness 04/25/2016    Normal coronary arteries 12/21/2016    Palpitations 05/17/2016    Dyslipidemia 04/13/2016    Dizziness 04/13/2016    Precordial pain 04/13/2016    TIA (transient ischemic attack) 03/13/2016    Hypertension 2016    Hypothyroidism 2016    Hyperglycemia 2016    UTI (urinary tract infection) 2016     Current Outpatient Medications   Medication Sig Dispense Refill    montelukast (Singulair) 10 mg tablet Take 10 mg by mouth daily.  budesonide (PULMICORT) 0.25 mg/2 mL nbsp by Nebulization route.  albuterol (ACCUNEB) 0.63 mg/3 mL nebulizer solution 0.63 mg by Nebulization route every six (6) hours as needed for Wheezing.  amitriptyline (ELAVIL) 10 mg tablet Take 10 mg by mouth nightly.  aspirin delayed-release 81 mg tablet Take 81 mg by mouth daily.  gabapentin (NEURONTIN) 300 mg capsule Take 300 mg by mouth three (3) times daily.  metoprolol succinate (TOPROL-XL) 25 mg XL tablet Take 25 mg by mouth daily.  levothyroxine (SYNTHROID) 25 mcg tablet Take 25 mcg by mouth Daily (before breakfast).  atorvastatin (LIPITOR) 20 mg tablet Take 20 mg by mouth daily.  glucosamine-chondroitin (ARTHX) 500-400 mg cap Take 2 Caps by mouth daily.  olmesartan (BENICAR) 40 mg tablet Take 1 Tablet by mouth daily. 90 Tablet 3     No Known Allergies  Past Medical History:   Diagnosis Date    Frozen shoulder     right side    GERD (gastroesophageal reflux disease)     Hypercholesterolemia     Hypertension     Hypothyroid     Neuropathy      Past Surgical History:   Procedure Laterality Date    COLONOSCOPY N/A 2019    COLONOSCOPY performed by Jorge Sun MD at [address] [place] [address] ENDOSCOPY    HX [address] [place] [address]  02/15/2018    Robotic single-site cholecystectomy    HX OOPHORECTOMY       No family history on file.   Social History     Tobacco Use   Smoking Status Former Smoker    Quit date: 1981    Years since quittin.7   Smokeless Tobacco Never Used          Review of Systems, additional:  Constitutional: negative  Eyes: negative  Respiratory: negative  Cardiovascular: positive for palpitations  Gastrointestinal: negative  Musculoskeletal:negative  Neurological: negative  Behvioral/Psych: negative  Endocrine: negative  ENT: negative    Objective:     Visit Vitals  BP (!) 214/100   Pulse 71   Temp 98.3 °F (36.8 °C) (Temporal)   Wt 80.7 kg (178 lb)   SpO2 98%   BMI 28.73 kg/m²     General:  alert, cooperative, no distress   Chest Wall: inspection normal - no chest wall deformities or tenderness, respiratory effort normal   Lung: clear to auscultation bilaterally   Heart:  normal rate, regular rhythm, normal S1, S2, no murmurs, rubs, clicks or gallops   Abdomen: soft, non-tender. Bowel sounds normal. No masses,  no organomegaly   Extremities: extremities normal, atraumatic, no cyanosis or edema Skin: no rashes   Neuro: alert, oriented, normal speech, no focal findings or movement disorder noted         Assessment/Plan:       ICD-10-CM ICD-9-CM    1. Essential hypertension, BP not severely elevated at present, will discontinue lisinopril and start Benicar 20 mg daily. Blood pressure check in 1 week. Increase Benicar to 40 mg at that time. The patient reports that her blood pressure at home is usually well controlled. Return to the office in 1 week for a BP check. Will need  evaluation for possible pheochromocytoma. I10 401.9    2. Transient cerebral ischemia, unspecified type G45.9 435.9    3. Dyslipidemia E78.5 272.4    4. Dizziness R42 780.4    5. Precordial pain, atypical for cardiac origin with normal coronaries by Cardiac CTA in 11/2016. Will order nuclear stress test. R07.2 786.51    6. Palpitations,  evaluated by EP. Will continue metoprolol. Last 48 hour holter on 3/3/2017; Minimal ectopy with no repetitive ventricular ectopics. R00.2 785.1    7.  Normal coronary arteries, by cardiac CTA done on 11/7/2016 Z03.89 V71.7

## 2022-06-28 ENCOUNTER — TELEPHONE (OUTPATIENT)
Dept: CARDIOLOGY CLINIC | Age: 67
End: 2022-06-28

## 2022-06-28 NOTE — TELEPHONE ENCOUNTER
Contacted pt at home number. Two patient Identifiers confirmed. PT st's she d/c'd her benicar 40 mg on her own and restarted lisinopril 40 mg daily x 2 weeks ago. PT stated she was on vacation and was feeling bad. I asked why she d/c'd the medication and restarted the other and she informed me that she was having chest pains more when taking the benicar. PT has an upcoming appt on 07/22/2022 with Dr. Megan Pruitt and would like to stay on the lisinopril until having her visit. Advised she should always contact the Dr before stopping and starting medications on her own. St's she feels much better since changing medications and her BP has been around 120's/60's. Will advise with Dr. Megan Pruitt when in office.

## 2022-06-29 ENCOUNTER — OFFICE VISIT (OUTPATIENT)
Dept: CARDIOLOGY CLINIC | Age: 67
End: 2022-06-29

## 2022-06-29 RX ORDER — LISINOPRIL 40 MG/1
40 TABLET ORAL DAILY
COMMUNITY
End: 2022-07-22 | Stop reason: SDUPTHER

## 2022-06-29 NOTE — TELEPHONE ENCOUNTER
Contacted pt at [facility] number. Two patient Identifiers confirmed. Advised pt per Dr Gallo Mancia. Pt verbalized understanding.

## 2022-06-29 NOTE — TELEPHONE ENCOUNTER
Verbal order and read back per Seth Martinez MD  Continue lisinopril 40 mg daily ; d/c benicar ; monitor Bp and HR; will review at  on 7/22/2022

## 2022-07-22 ENCOUNTER — OFFICE VISIT (OUTPATIENT)
Dept: CARDIOLOGY CLINIC | Age: 67
End: 2022-07-22
Payer: COMMERCIAL

## 2022-07-22 VITALS
SYSTOLIC BLOOD PRESSURE: 170 MMHG | WEIGHT: 178 LBS | RESPIRATION RATE: 16 BRPM | DIASTOLIC BLOOD PRESSURE: 90 MMHG | BODY MASS INDEX: 28.73 KG/M2 | OXYGEN SATURATION: 99 % | HEART RATE: 69 BPM | TEMPERATURE: 98 F

## 2022-07-22 DIAGNOSIS — I10 PRIMARY HYPERTENSION: Primary | ICD-10-CM

## 2022-07-22 PROCEDURE — 99214 OFFICE O/P EST MOD 30 MIN: CPT | Performed by: INTERNAL MEDICINE

## 2022-07-22 PROCEDURE — 1123F ACP DISCUSS/DSCN MKR DOCD: CPT | Performed by: INTERNAL MEDICINE

## 2022-07-22 NOTE — PROGRESS NOTES
Identified pt with two pt identifiers(name and ). Reviewed record in preparation for visit and have obtained necessary documentation. Ruma Colbert presents today for   Chief Complaint   Patient presents with    Follow-up     4 weeks       Pt c/o bilateral ankle SWELLING. Ruma Colbert preferred language for health care discussion is english/other. Personal Protective Equipment:   Personal Protective Equipment was used including: mask-surgical and hands-gloves. Patient was placed on no precaution(s). Patient was masked. Precautions:   Patient currently on None  Patient currently roomed with door closed. Is someone accompanying this pt? no    Is the patient using any DME equipment during [address]? no    Depression Screening:  3 most recent PHQ Screens 2022   Little interest or pleasure in doing things Not at all   Feeling down, depressed, irritable, or hopeless Not at all   Total Score PHQ 2 0       Learning Assessment:  Learning Assessment 2016   PRIMARY LEARNER Patient   PRIMARY LANGUAGE ENGLISH   LEARNER PREFERENCE PRIMARY READING   ANSWERED BY Patient   RELATIONSHIP SELF       Abuse Screening:  No flowsheet data found. Fall Risk  Fall Risk Assessment, last 12 mths 2022   Able to walk? Yes       Pt currently taking Anticoagulant therapy? no  Pt currently taking Antiplatelet therapy? yes    Coordination of Care:  1. Have you been to the ER, urgent care clinic since your last visit? Hospitalized since your last visit? no    2. Have you seen or consulted any other health care providers outside of the [address] [place] [address] since your last visit? Include any pap smears or colon screening. no      Please see Red banners under Allergies and Med Rec to remove outside inquires. All correct information has been verified with patient and added to chart.      Medication's patient's would liked removed has been marked not taking to be removed per Verbal order and read back per Elida Hennessy MD

## 2022-07-22 NOTE — TELEPHONE ENCOUNTER
PCP: Martita Handy MD    Last appt: 7/22/2022  No future appointments. Requested Prescriptions     Pending Prescriptions Disp Refills    lisinopriL (PRINIVIL, ZESTRIL) 40 mg tablet 90 Tablet 3     Sig: Take 1 Tablet by mouth in the morning.

## 2022-07-26 RX ORDER — LISINOPRIL 40 MG/1
40 TABLET ORAL DAILY
Qty: 90 TABLET | Refills: 3 | Status: SHIPPED | OUTPATIENT
Start: 2022-07-26

## 2022-07-31 NOTE — PROGRESS NOTES
Subjective:      Faustina Garza is in the office today for cardiac reevaluation. She is a 60-year-old woman that has been troubled by palpitations in the past. She has been on various betablockers and remains on metoprolol succinate at 25 mg per day. She had  excessive slowing of heart rate on betablockers in the past.  She was also on Cardizem at one point, which she did not tolerate. In the office today, she reports that her blood pressure has been well controlled at home for the last couple of months. She also complains of chest pain. She had a  CTA of the heart done on 11/07/2016. Her coronaries were normal at that time. Because of the persistence of her symptoms, she had an EP office evaluation done by Dr. Florence Nye on 02/01/2017. Dr. Florence Nye mentioned that when the patient was complaining of palpitations, he was taking her pulse and there was no irregularity, which was confirmed by electrocardiography. No further suggestions in her care were made at that time. In the office today,  she reports that she was switched back to lisinopril because she thought she was having more chest pain and palpitations while taking the Benicar. She now has rare episodes of chest discomfort. She has occasional mild ankle swelling. She has palpitations as mentioned particularly in the morning. Patient Active Problem List    Diagnosis Date Noted    Numbness 04/25/2016    Normal coronary arteries 12/21/2016    Palpitations 05/17/2016    Dyslipidemia 04/13/2016    Dizziness 04/13/2016    Precordial pain 04/13/2016    TIA (transient ischemic attack) 03/13/2016    Hypertension 03/13/2016    Hypothyroidism 03/13/2016    Hyperglycemia 03/13/2016    UTI (urinary tract infection) 03/13/2016     Current Outpatient Medications   Medication Sig Dispense Refill    montelukast (SINGULAIR) 10 mg tablet Take 10 mg by mouth daily. budesonide (PULMICORT) 0.25 mg/2 mL nbsp by Nebulization route.       albuterol (ACCUNEB) 0.63 mg/3 mL nebulizer solution 0.63 mg by Nebulization route every six (6) hours as needed for Wheezing. amitriptyline (ELAVIL) 10 mg tablet Take 10 mg by mouth nightly. aspirin delayed-release 81 mg tablet Take 81 mg by mouth daily. gabapentin (NEURONTIN) 300 mg capsule Take 300 mg by mouth three (3) times daily. metoprolol succinate (TOPROL-XL) 25 mg XL tablet Take 25 mg by mouth daily. levothyroxine (SYNTHROID) 25 mcg tablet Take 25 mcg by mouth Daily (before breakfast). atorvastatin (LIPITOR) 20 mg tablet Take 20 mg by mouth daily. glucosamine-chondroitin (ARTHX) 500-400 mg cap Take 2 Caps by mouth daily. lisinopriL (PRINIVIL, ZESTRIL) 40 mg tablet Take 1 Tablet by mouth in the morning. 90 Tablet 3     No Known Allergies  Past Medical History:   Diagnosis Date    Frozen shoulder     right side    GERD (gastroesophageal reflux disease)     Hypercholesterolemia     Hypertension     Hypothyroid     Neuropathy      Past Surgical History:   Procedure Laterality Date    COLONOSCOPY N/A 2019    COLONOSCOPY performed by Pollo Rushing MD at [facility] ENDOSCOPY    [address]  02/15/2018    Robotic single-site cholecystectomy    HX OOPHORECTOMY       No family history on file.   Social History     Tobacco Use   Smoking Status Former    Types: Cigarettes    Quit date: 1981    Years since quittin.9   Smokeless Tobacco Never          Review of Systems, additional:  Constitutional: negative  Eyes: negative  Respiratory: negative  Cardiovascular: positive for palpitations  Gastrointestinal: negative  Musculoskeletal:negative  Neurological: negative  Behvioral/Psych: negative  Endocrine: negative  ENT: negative    Objective:     Visit Vitals  BP (!) 170/90   Pulse 69   Temp 98 °F (36.7 °C) (Temporal)   Resp 16   Wt 80.7 kg (178 lb)   SpO2 99%   BMI 28.73 kg/m²     General:  alert, cooperative, no distress   Chest Wall: inspection normal - no chest wall deformities or tenderness, respiratory effort normal   Lung: clear to auscultation bilaterally   Heart:  normal rate, regular rhythm, normal S1, S2, no murmurs, rubs, clicks or gallops   Abdomen: soft, non-tender. Bowel sounds normal. No masses,  no organomegaly   Extremities: extremities normal, atraumatic, no cyanosis or edema Skin: no rashes   Neuro: alert, oriented, normal speech, no focal findings or movement disorder noted         Assessment/Plan:       ICD-10-CM ICD-9-CM    1. Essential hypertension, BP severely elevated at present,   The patient reports that her blood pressure at home is usually well controlled. She is off the olmesartan and back on lisinopril 40 mg daily. She thought she was having chest pain and palpitations while she was taking the olmesartan. She is also taking metoprolol succinate 25 mg once daily. Return in 6 months. She will continue to follow her blood pressures at home. I10 401.9    2. Transient cerebral ischemia, unspecified type G45.9 435.9    3. Dyslipidemia E78.5 272.4    4. Dizziness R42 780.4    5. Precordial pain, atypical for cardiac origin with normal coronaries by Cardiac CTA in 11/2016. Will order nuclear stress test. R07.2 786.51    6. Palpitations,  evaluated by EP. Will continue metoprolol. Last 48 hour holter on 3/3/2017; Minimal ectopy with no repetitive ventricular ectopics. R00.2 785.1    7.  Normal coronary arteries, by cardiac CTA done on 11/7/2016 Z03.89 V71.7

## 2022-08-22 DIAGNOSIS — R07.9 CHEST PAIN, UNSPECIFIED TYPE: ICD-10-CM

## 2023-06-26 ENCOUNTER — OFFICE VISIT (OUTPATIENT)
Age: 68
End: 2023-06-26

## 2023-06-26 VITALS
HEART RATE: 83 BPM | WEIGHT: 180 LBS | OXYGEN SATURATION: 97 % | DIASTOLIC BLOOD PRESSURE: 110 MMHG | BODY MASS INDEX: 28.93 KG/M2 | SYSTOLIC BLOOD PRESSURE: 190 MMHG | HEIGHT: 66 IN

## 2023-06-26 DIAGNOSIS — I10 ESSENTIAL (PRIMARY) HYPERTENSION: Primary | ICD-10-CM

## 2023-06-26 RX ORDER — B-COMPLEX WITH VITAMIN C
TABLET ORAL
COMMUNITY

## 2023-06-26 RX ORDER — OMEGA-3/DHA/EPA/FISH OIL 60 MG-90MG
500 CAPSULE ORAL DAILY
COMMUNITY

## 2023-06-28 RX ORDER — METOPROLOL SUCCINATE 50 MG/1
50 TABLET, EXTENDED RELEASE ORAL DAILY
Qty: 30 TABLET | Refills: 6 | Status: SHIPPED | OUTPATIENT
Start: 2023-06-28

## 2023-07-25 RX ORDER — LISINOPRIL 40 MG/1
TABLET ORAL
Qty: 90 TABLET | Refills: 3 | Status: SHIPPED | OUTPATIENT
Start: 2023-07-25

## 2024-02-06 RX ORDER — METOPROLOL SUCCINATE 50 MG/1
50 TABLET, EXTENDED RELEASE ORAL DAILY
Qty: 30 TABLET | Refills: 6 | Status: SHIPPED | OUTPATIENT
Start: 2024-02-06

## 2024-07-26 RX ORDER — LISINOPRIL 40 MG/1
TABLET ORAL
Qty: 90 TABLET | Refills: 3 | OUTPATIENT
Start: 2024-07-26

## 2024-08-01 NOTE — PROGRESS NOTES
Tod Bautista was seen in our office today for BP evaluation. Listed below are the patients current meds:      Current Outpatient Medications:     olmesartan (BENICAR) 40 mg tablet, Take 1 Tablet by mouth daily. , Disp: 90 Tablet, Rfl: 3    montelukast (Singulair) 10 mg tablet, Take 10 mg by mouth daily. , Disp: , Rfl:     budesonide (PULMICORT) 0.25 mg/2 mL nbsp, by Nebulization route., Disp: , Rfl:     albuterol (ACCUNEB) 0.63 mg/3 mL nebulizer solution, 0.63 mg by Nebulization route every six (6) hours as needed for Wheezing., Disp: , Rfl:     amitriptyline (ELAVIL) 10 mg tablet, Take 10 mg by mouth nightly., Disp: , Rfl:     aspirin delayed-release 81 mg tablet, Take 81 mg by mouth daily. , Disp: , Rfl:     gabapentin (NEURONTIN) 300 mg capsule, Take 300 mg by mouth three (3) times daily. , Disp: , Rfl:     metoprolol succinate (TOPROL-XL) 25 mg XL tablet, Take 25 mg by mouth daily. , Disp: , Rfl:     levothyroxine (SYNTHROID) 25 mcg tablet, Take 25 mcg by mouth Daily (before breakfast). , Disp: , Rfl:     atorvastatin (LIPITOR) 20 mg tablet, Take 20 mg by mouth daily. , Disp: , Rfl:     glucosamine-chondroitin (ARTHX) 500-400 mg cap, Take 2 Caps by mouth daily. , Disp: , Rfl:     Pt stated medication taken prior to appt. Pt also supplied at home BP reading fore review by provider all WNL except two dated May 19, 2022 and May 17, 2560 have systolic at 493 and 389 respectively. No current facility-administered medications for this visit. Visit Vitals  BP (!) 175/73   Pulse 65       Plan:     Patient to continue current medications. Advised patient that I would forward to Dr. Joseph Shi for review. for review and further instructions. Advised patient that we would call within 24-48 hours with any changes. Patient verbalized understanding. Add Me Farooq Blake (Attending)

## (undated) DEVICE — Device

## (undated) DEVICE — NEEDLE HYPO 25GA L1.5IN BVL ORIENTED ECLIPSE

## (undated) DEVICE — SYR 50ML SLIP TIP NSAF LF STRL --

## (undated) DEVICE — SPECIMEN RETRIEVAL POUCH: Brand: ENDO CATCH GOLD

## (undated) DEVICE — LIGHT HANDLE: Brand: DEVON

## (undated) DEVICE — ARM DRAPE

## (undated) DEVICE — TRAP SPEC COLL POLYP POLYSTYR --

## (undated) DEVICE — INSULATED BLADE ELECTRODE: Brand: EDGE

## (undated) DEVICE — 12 ML SYRINGE,LUER-LOCK TIP: Brand: MONOJECT

## (undated) DEVICE — TUBING, SUCTION, 3/16" X 6', STRAIGHT: Brand: MEDLINE

## (undated) DEVICE — SOL ANTI-FOG 6ML MEDC -- MEDICHOICE - CONVERT TO 358427

## (undated) DEVICE — KENDALL 500 SERIES DIAPHORETIC FOAM MONITORING ELECTRODE - TEAR DROP SHAPE ( 30/PK): Brand: KENDALL

## (undated) DEVICE — (D)PREP SKN CHLRAPRP APPL 26ML -- CONVERT TO ITEM 371833

## (undated) DEVICE — DERMABOND SKIN ADH 0.7ML -- DERMABOND ADVANCED 12/BX

## (undated) DEVICE — SNARE ENDOSCP M L240CM W27MM SHTH DIA2.4MM CHN 2.8MM HEX

## (undated) DEVICE — BASIN EMESIS 500CC ROSE 250/CS 60/PLT: Brand: MEDEGEN MEDICAL PRODUCTS, LLC

## (undated) DEVICE — SOL IRR NACL 0.9% 500ML POUR --

## (undated) DEVICE — CARTRIDGE CLP LIG HEMLOK GRN --

## (undated) DEVICE — SUTURE MCRYL SZ 4-0 L18IN ABSRB UD L19MM PS-2 3/8 CIR PRIM Y496G

## (undated) DEVICE — COVER LT HNDL BLU PLAS

## (undated) DEVICE — FLEX ADVANTAGE 1500CC: Brand: FLEX ADVANTAGE

## (undated) DEVICE — KENDALL SCD EXPRESS SLEEVES, KNEE LENGTH, MEDIUM: Brand: KENDALL SCD

## (undated) DEVICE — BLADELESS OBTURATOR: Brand: WECK VISTA

## (undated) DEVICE — INTENDED FOR TISSUE SEPARATION, AND OTHER PROCEDURES THAT REQUIRE A SHARP SURGICAL BLADE TO PUNCTURE OR CUT.: Brand: BARD-PARKER ® CARBON RIB-BACK BLADES

## (undated) DEVICE — DRAPE,UTILITY,TAPE,15X26,STERILE: Brand: MEDLINE

## (undated) DEVICE — SEAL UNIV 5-8MM DISP BX/10 -- DA VINCI XI - SNGL USE

## (undated) DEVICE — BLADELESS OPTICAL TROCAR WITH FIXATION CANNULA: Brand: VERSAPORT

## (undated) DEVICE — COLUMN DRAPE

## (undated) DEVICE — REM POLYHESIVE ADULT PATIENT RETURN ELECTRODE: Brand: VALLEYLAB

## (undated) DEVICE — CONTAINER PREFIL FRMLN 15ML --

## (undated) DEVICE — STERILE POLYISOPRENE POWDER-FREE SURGICAL GLOVES: Brand: PROTEXIS